# Patient Record
Sex: FEMALE | Race: BLACK OR AFRICAN AMERICAN | NOT HISPANIC OR LATINO
[De-identification: names, ages, dates, MRNs, and addresses within clinical notes are randomized per-mention and may not be internally consistent; named-entity substitution may affect disease eponyms.]

---

## 2017-06-19 ENCOUNTER — RX RENEWAL (OUTPATIENT)
Age: 27
End: 2017-06-19

## 2017-06-19 ENCOUNTER — APPOINTMENT (OUTPATIENT)
Dept: OBGYN | Facility: CLINIC | Age: 27
End: 2017-06-19

## 2017-06-21 ENCOUNTER — RX RENEWAL (OUTPATIENT)
Age: 27
End: 2017-06-21

## 2023-04-06 ENCOUNTER — APPOINTMENT (OUTPATIENT)
Dept: ANTEPARTUM | Facility: CLINIC | Age: 33
End: 2023-04-06

## 2023-04-06 ENCOUNTER — APPOINTMENT (OUTPATIENT)
Dept: ANTEPARTUM | Facility: CLINIC | Age: 33
End: 2023-04-06
Payer: MEDICAID

## 2023-04-06 ENCOUNTER — INPATIENT (INPATIENT)
Facility: HOSPITAL | Age: 33
LOS: 3 days | Discharge: HOME | End: 2023-04-10
Attending: OBSTETRICS & GYNECOLOGY | Admitting: OBSTETRICS & GYNECOLOGY
Payer: MEDICAID

## 2023-04-06 ENCOUNTER — ASOB RESULT (OUTPATIENT)
Age: 33
End: 2023-04-06

## 2023-04-06 ENCOUNTER — EMERGENCY (EMERGENCY)
Facility: HOSPITAL | Age: 33
LOS: 1 days | Discharge: LEFT BEFORE DISCHARGE | End: 2023-04-06
Admitting: EMERGENCY MEDICINE
Payer: MEDICAID

## 2023-04-06 VITALS
RESPIRATION RATE: 18 BRPM | HEART RATE: 76 BPM | TEMPERATURE: 98 F | OXYGEN SATURATION: 100 % | SYSTOLIC BLOOD PRESSURE: 93 MMHG | DIASTOLIC BLOOD PRESSURE: 58 MMHG

## 2023-04-06 VITALS
RESPIRATION RATE: 16 BRPM | TEMPERATURE: 98 F | DIASTOLIC BLOOD PRESSURE: 58 MMHG | HEART RATE: 79 BPM | SYSTOLIC BLOOD PRESSURE: 103 MMHG

## 2023-04-06 DIAGNOSIS — Z98.890 OTHER SPECIFIED POSTPROCEDURAL STATES: Chronic | ICD-10-CM

## 2023-04-06 DIAGNOSIS — O26.899 OTHER SPECIFIED PREGNANCY RELATED CONDITIONS, UNSPECIFIED TRIMESTER: ICD-10-CM

## 2023-04-06 LAB
ALBUMIN SERPL ELPH-MCNC: 3.6 G/DL — SIGNIFICANT CHANGE UP (ref 3.3–5)
ALBUMIN SERPL ELPH-MCNC: 3.8 G/DL — SIGNIFICANT CHANGE UP (ref 3.3–5)
ALP SERPL-CCNC: 60 U/L — SIGNIFICANT CHANGE UP (ref 40–120)
ALP SERPL-CCNC: 61 U/L — SIGNIFICANT CHANGE UP (ref 40–120)
ALT FLD-CCNC: 15 U/L — SIGNIFICANT CHANGE UP (ref 4–33)
ALT FLD-CCNC: 19 U/L — SIGNIFICANT CHANGE UP (ref 4–33)
AMPHET UR-MCNC: NEGATIVE — SIGNIFICANT CHANGE UP
ANION GAP SERPL CALC-SCNC: 10 MMOL/L — SIGNIFICANT CHANGE UP (ref 7–14)
ANION GAP SERPL CALC-SCNC: 9 MMOL/L — SIGNIFICANT CHANGE UP (ref 7–14)
APPEARANCE UR: CLEAR — SIGNIFICANT CHANGE UP
APTT BLD: 27.9 SEC — SIGNIFICANT CHANGE UP (ref 27–36.3)
AST SERPL-CCNC: 21 U/L — SIGNIFICANT CHANGE UP (ref 4–32)
AST SERPL-CCNC: 26 U/L — SIGNIFICANT CHANGE UP (ref 4–32)
BARBITURATES UR SCN-MCNC: NEGATIVE — SIGNIFICANT CHANGE UP
BASOPHILS # BLD AUTO: 0.01 K/UL — SIGNIFICANT CHANGE UP (ref 0–0.2)
BASOPHILS NFR BLD AUTO: 0.1 % — SIGNIFICANT CHANGE UP (ref 0–2)
BENZODIAZ UR-MCNC: NEGATIVE — SIGNIFICANT CHANGE UP
BILIRUB SERPL-MCNC: <0.2 MG/DL — SIGNIFICANT CHANGE UP (ref 0.2–1.2)
BILIRUB SERPL-MCNC: <0.2 MG/DL — SIGNIFICANT CHANGE UP (ref 0.2–1.2)
BILIRUB UR-MCNC: NEGATIVE — SIGNIFICANT CHANGE UP
BUN SERPL-MCNC: 8 MG/DL — SIGNIFICANT CHANGE UP (ref 7–23)
BUN SERPL-MCNC: 9 MG/DL — SIGNIFICANT CHANGE UP (ref 7–23)
CALCIUM SERPL-MCNC: 8.9 MG/DL — SIGNIFICANT CHANGE UP (ref 8.4–10.5)
CALCIUM SERPL-MCNC: 9 MG/DL — SIGNIFICANT CHANGE UP (ref 8.4–10.5)
CHLORIDE SERPL-SCNC: 103 MMOL/L — SIGNIFICANT CHANGE UP (ref 98–107)
CHLORIDE SERPL-SCNC: 105 MMOL/L — SIGNIFICANT CHANGE UP (ref 98–107)
CO2 SERPL-SCNC: 21 MMOL/L — LOW (ref 22–31)
CO2 SERPL-SCNC: 22 MMOL/L — SIGNIFICANT CHANGE UP (ref 22–31)
COCAINE METAB.OTHER UR-MCNC: NEGATIVE — SIGNIFICANT CHANGE UP
COLOR SPEC: SIGNIFICANT CHANGE UP
COVID-19 SPIKE DOMAIN AB INTERP: POSITIVE
COVID-19 SPIKE DOMAIN ANTIBODY RESULT: >250 U/ML — HIGH
CREAT SERPL-MCNC: 0.54 MG/DL — SIGNIFICANT CHANGE UP (ref 0.5–1.3)
CREAT SERPL-MCNC: 0.55 MG/DL — SIGNIFICANT CHANGE UP (ref 0.5–1.3)
CREATININE URINE RESULT, DAU: 73 MG/DL — SIGNIFICANT CHANGE UP
DIFF PNL FLD: NEGATIVE — SIGNIFICANT CHANGE UP
EGFR: 125 ML/MIN/1.73M2 — SIGNIFICANT CHANGE UP
EGFR: 125 ML/MIN/1.73M2 — SIGNIFICANT CHANGE UP
EOSINOPHIL # BLD AUTO: 0.09 K/UL — SIGNIFICANT CHANGE UP (ref 0–0.5)
EOSINOPHIL NFR BLD AUTO: 1.2 % — SIGNIFICANT CHANGE UP (ref 0–6)
FIBRINOGEN PPP-MCNC: 399 MG/DL — SIGNIFICANT CHANGE UP (ref 200–465)
GLUCOSE SERPL-MCNC: 79 MG/DL — SIGNIFICANT CHANGE UP (ref 70–99)
GLUCOSE SERPL-MCNC: 98 MG/DL — SIGNIFICANT CHANGE UP (ref 70–99)
GLUCOSE UR QL: NEGATIVE — SIGNIFICANT CHANGE UP
HCT VFR BLD CALC: 28.6 % — LOW (ref 34.5–45)
HCT VFR BLD CALC: 30.1 % — LOW (ref 34.5–45)
HGB BLD-MCNC: 9.2 G/DL — LOW (ref 11.5–15.5)
HGB BLD-MCNC: 9.7 G/DL — LOW (ref 11.5–15.5)
IANC: 5.16 K/UL — SIGNIFICANT CHANGE UP (ref 1.8–7.4)
IMM GRANULOCYTES NFR BLD AUTO: 0.3 % — SIGNIFICANT CHANGE UP (ref 0–0.9)
KETONES UR-MCNC: NEGATIVE — SIGNIFICANT CHANGE UP
KLEIHAUER-BETKE CALCULATION: 0 % — SIGNIFICANT CHANGE UP
LEUKOCYTE ESTERASE UR-ACNC: NEGATIVE — SIGNIFICANT CHANGE UP
LYMPHOCYTES # BLD AUTO: 1.42 K/UL — SIGNIFICANT CHANGE UP (ref 1–3.3)
LYMPHOCYTES # BLD AUTO: 19.6 % — SIGNIFICANT CHANGE UP (ref 13–44)
MAGNESIUM SERPL-MCNC: 1.7 MG/DL — SIGNIFICANT CHANGE UP (ref 1.6–2.6)
MCHC RBC-ENTMCNC: 29 PG — SIGNIFICANT CHANGE UP (ref 27–34)
MCHC RBC-ENTMCNC: 29.1 PG — SIGNIFICANT CHANGE UP (ref 27–34)
MCHC RBC-ENTMCNC: 32.2 GM/DL — SIGNIFICANT CHANGE UP (ref 32–36)
MCHC RBC-ENTMCNC: 32.2 GM/DL — SIGNIFICANT CHANGE UP (ref 32–36)
MCV RBC AUTO: 90.1 FL — SIGNIFICANT CHANGE UP (ref 80–100)
MCV RBC AUTO: 90.5 FL — SIGNIFICANT CHANGE UP (ref 80–100)
METHADONE UR-MCNC: NEGATIVE — SIGNIFICANT CHANGE UP
MONOCYTES # BLD AUTO: 0.54 K/UL — SIGNIFICANT CHANGE UP (ref 0–0.9)
MONOCYTES NFR BLD AUTO: 7.5 % — SIGNIFICANT CHANGE UP (ref 2–14)
NEUTROPHILS # BLD AUTO: 5.16 K/UL — SIGNIFICANT CHANGE UP (ref 1.8–7.4)
NEUTROPHILS NFR BLD AUTO: 71.3 % — SIGNIFICANT CHANGE UP (ref 43–77)
NITRITE UR-MCNC: NEGATIVE — SIGNIFICANT CHANGE UP
NRBC # BLD: 0 /100 WBCS — SIGNIFICANT CHANGE UP (ref 0–0)
NRBC # BLD: 0 /100 WBCS — SIGNIFICANT CHANGE UP (ref 0–0)
NRBC # FLD: 0 K/UL — SIGNIFICANT CHANGE UP (ref 0–0)
NRBC # FLD: 0 K/UL — SIGNIFICANT CHANGE UP (ref 0–0)
OPIATES UR-MCNC: NEGATIVE — SIGNIFICANT CHANGE UP
OXYCODONE UR-MCNC: NEGATIVE — SIGNIFICANT CHANGE UP
PCP SPEC-MCNC: SIGNIFICANT CHANGE UP
PCP UR-MCNC: NEGATIVE — SIGNIFICANT CHANGE UP
PH UR: 7 — SIGNIFICANT CHANGE UP (ref 5–8)
PHOSPHATE SERPL-MCNC: 3.2 MG/DL — SIGNIFICANT CHANGE UP (ref 2.5–4.5)
PLATELET # BLD AUTO: 204 K/UL — SIGNIFICANT CHANGE UP (ref 150–400)
PLATELET # BLD AUTO: 212 K/UL — SIGNIFICANT CHANGE UP (ref 150–400)
POTASSIUM SERPL-MCNC: 3.5 MMOL/L — SIGNIFICANT CHANGE UP (ref 3.5–5.3)
POTASSIUM SERPL-MCNC: 3.6 MMOL/L — SIGNIFICANT CHANGE UP (ref 3.5–5.3)
POTASSIUM SERPL-SCNC: 3.5 MMOL/L — SIGNIFICANT CHANGE UP (ref 3.5–5.3)
POTASSIUM SERPL-SCNC: 3.6 MMOL/L — SIGNIFICANT CHANGE UP (ref 3.5–5.3)
PROT SERPL-MCNC: 5.7 G/DL — LOW (ref 6–8.3)
PROT SERPL-MCNC: 6.4 G/DL — SIGNIFICANT CHANGE UP (ref 6–8.3)
PROT UR-MCNC: NEGATIVE — SIGNIFICANT CHANGE UP
RBC # BLD: 3.16 M/UL — LOW (ref 3.8–5.2)
RBC # BLD: 3.34 M/UL — LOW (ref 3.8–5.2)
RBC # FLD: 12.5 % — SIGNIFICANT CHANGE UP (ref 10.3–14.5)
RBC # FLD: 12.5 % — SIGNIFICANT CHANGE UP (ref 10.3–14.5)
RH IG SCN BLD-IMP: POSITIVE — SIGNIFICANT CHANGE UP
RH IG SCN BLD-IMP: POSITIVE — SIGNIFICANT CHANGE UP
SARS-COV-2 IGG+IGM SERPL QL IA: >250 U/ML — HIGH
SARS-COV-2 IGG+IGM SERPL QL IA: POSITIVE
SARS-COV-2 RNA SPEC QL NAA+PROBE: SIGNIFICANT CHANGE UP
SODIUM SERPL-SCNC: 134 MMOL/L — LOW (ref 135–145)
SODIUM SERPL-SCNC: 136 MMOL/L — SIGNIFICANT CHANGE UP (ref 135–145)
SP GR SPEC: 1.01 — SIGNIFICANT CHANGE UP (ref 1.01–1.05)
T PALLIDUM AB TITR SER: NEGATIVE — SIGNIFICANT CHANGE UP
THC UR QL: POSITIVE
URATE SERPL-MCNC: 2.9 MG/DL — SIGNIFICANT CHANGE UP (ref 2.5–7)
UROBILINOGEN FLD QL: SIGNIFICANT CHANGE UP
WBC # BLD: 7.24 K/UL — SIGNIFICANT CHANGE UP (ref 3.8–10.5)
WBC # BLD: 8.12 K/UL — SIGNIFICANT CHANGE UP (ref 3.8–10.5)
WBC # FLD AUTO: 7.24 K/UL — SIGNIFICANT CHANGE UP (ref 3.8–10.5)
WBC # FLD AUTO: 8.12 K/UL — SIGNIFICANT CHANGE UP (ref 3.8–10.5)

## 2023-04-06 PROCEDURE — 76827 ECHO EXAM OF FETAL HEART: CPT | Mod: 26

## 2023-04-06 PROCEDURE — 99254 IP/OBS CNSLTJ NEW/EST MOD 60: CPT | Mod: GC

## 2023-04-06 PROCEDURE — 93306 TTE W/DOPPLER COMPLETE: CPT | Mod: 26

## 2023-04-06 PROCEDURE — 99254 IP/OBS CNSLTJ NEW/EST MOD 60: CPT | Mod: 25

## 2023-04-06 PROCEDURE — 76825 ECHO EXAM OF FETAL HEART: CPT | Mod: 26

## 2023-04-06 PROCEDURE — 76820 UMBILICAL ARTERY ECHO: CPT | Mod: 26

## 2023-04-06 PROCEDURE — 93010 ELECTROCARDIOGRAM REPORT: CPT

## 2023-04-06 PROCEDURE — L9996: CPT

## 2023-04-06 PROCEDURE — 99232 SBSQ HOSP IP/OBS MODERATE 35: CPT

## 2023-04-06 PROCEDURE — 76819 FETAL BIOPHYS PROFIL W/O NST: CPT | Mod: 26

## 2023-04-06 PROCEDURE — 93325 DOPPLER ECHO COLOR FLOW MAPG: CPT | Mod: 26

## 2023-04-06 RX ORDER — SODIUM CHLORIDE 9 MG/ML
1000 INJECTION, SOLUTION INTRAVENOUS
Refills: 0 | Status: DISCONTINUED | OUTPATIENT
Start: 2023-04-06 | End: 2023-04-07

## 2023-04-06 RX ORDER — HEPARIN SODIUM 5000 [USP'U]/ML
5000 INJECTION INTRAVENOUS; SUBCUTANEOUS EVERY 12 HOURS
Refills: 0 | Status: DISCONTINUED | OUTPATIENT
Start: 2023-04-06 | End: 2023-04-10

## 2023-04-06 RX ADMIN — SODIUM CHLORIDE 125 MILLILITER(S): 9 INJECTION, SOLUTION INTRAVENOUS at 13:00

## 2023-04-06 NOTE — OB PROVIDER TRIAGE NOTE - NSHPPHYSICALEXAM_GEN_ALL_CORE
pt seen upon arrival     in [place]        pt in NAD    lungs clear   heart s1 s2   abd soft gravid non tender    placed on EFM  FHR  140      abd scan

## 2023-04-06 NOTE — CONSULT NOTE PEDS - ASSESSMENT
Ms. Walters is a 32 yo woman, , currently at 28 1/7 weeks gestation.  Her fetus has been found to be in supraventricular tachycardia (possibly AVNRT).  There are no signs of hydrops at this time.      Recommendation:  1. Admit mother to OB unit for fetal heart rate monitoring and maternal telemetry monitoring  2. Monitor fetal heart rates over the next 12 hours (overnight) to determine the frequency of the arrhythmia  3. BID non-stress tests  4. Obtain a maternal EKG, along with a basic metabolic panel  5. Please obtain an adult cardiology consultation for clearance to start an antiarrhythmic medication (we will likely treat with either digoxin or flecainide)  6. Decision on treatment will be made after monitoring for ~ 12 hours.

## 2023-04-06 NOTE — OB PROVIDER TRIAGE NOTE - HISTORY OF PRESENT ILLNESS
33 year old female   at 28.1 weeks gestation who was sent from office for Fetal tachycardia (  on scan via M mode)  and pt denied any prior know episodes of fetal tachycardia     denied any recent trauma  travel  or illnesses   denied any new medications or herbal products   denied any use of audra butter skin products     denied any contractions  LOF VB   feels Good FM.     PNC  [place]    Scan today    BPP 8/8 EFW 1039g (2.5#) 11%  posterior placenta  AAFI    stated Placenta previa resolved    willing to accept blood

## 2023-04-06 NOTE — CHART NOTE - NSCHARTNOTEFT_GEN_A_CORE
R3 Chart note    32yo  at 28w1d patient seen at bedside for evaluation after being sent in from OBGYN office for fetal heart rate measuring 260s in the office. Patient denies all complaints at this time. Denies ctx, LOF, VB and dec FM. Patient reports that this has been an uncomplicated pregnancy and denies any PMSH. NST reactive in triage with normal baseline until periods unable to trace. At which point, triage providers used sono to calculate FHR in the 260s. ATU official US being performed at this time and pediatric cardiology consultation placed for fetal echo and concern for fetal SVT. Recommendations pending full evaluation, plan to admit patient for further monitoring at this time.     seen and d/w Dr. Shelby Moore PGY3

## 2023-04-06 NOTE — CONSULT NOTE ADULT - NS ATTEND AMEND GEN_ALL_CORE FT
33 year old woman 28 weeks with a pmhx of JUANCARLOS who baby is found to have fetal SVT ( on scan via M mode). Adult EP consulted to assist in managing the patient during cardiac medication administration.     #Fetal SVT  - No contraindications to either Dig of Flecainide  - Can dig load per peds cardiology protocol  - On dig, please monitor for heart block, pAT, or AF. Would stop dig for any of the above  - Keep K at 4.0-4.5, Mg 2.0-2.2  - If Dig fails, no contraindication to starting Flecainide per peds cardiology protocol. Please start the patient on Metoprolol if Flecainide is initiated. 33 year old woman 28 weeks with a pmhx of JUANCARLOS who baby is found to have fetal SVT ( on scan via M mode). Adult EP consulted to assist in managing the patient during cardiac medication administration.     #Fetal SVT  - No contraindications to either Dig or Flecainide  - Can dig load per peds cardiology protocol  - On dig, please monitor for heart block, pAT, or AF. Would stop dig for any of the above  - Keep K at 4.0-4.5, Mg 2.0-2.2  - If Dig fails, no contraindication to starting Flecainide per peds cardiology protocol. Please start the patient on Metoprolol if Flecainide is initiated.

## 2023-04-06 NOTE — PROVIDER CONTACT NOTE (OTHER) - BACKGROUND
Pt admitted for antepartum observation after being sent from office for FHR 260s. pt r/o fetal SVT. Fetal echo performed, peds cardiology and adult cardiology consults performed.

## 2023-04-06 NOTE — CONSULT NOTE ADULT - ASSESSMENT
32yo  at 28w1d patient admitted with concern for fetal tachycardia.     #Concern for fetal SVT  - Appreciate peds cards recs  - Prelim recs: Maternal cardiology consult, EKG, ECHO  - f/u fetal echo     #Fetal wellbeing   - NST BID  - NICU consult prn     #Maternal wellbeing   - PNV  - Regular diet  - SCDs and ambulation for DTV prophylaxis     seen and d/w Dr. Shelby Moore PGY3

## 2023-04-06 NOTE — CONSULT NOTE ADULT - ASSESSMENT
This is a 33 year old woman 28 weeks  with a pmhx of JUANCARLOS who presented with fetal SVT ( on scan via M mode). Patient admitted to nausea during her pregnancy which she used marijuana to help control her nausea. Patient does not have cardiologist and stated that she never had a echocardiogram.  EP was consulted for maternal monitor as the fetal is in SVT. EKG showed SR HR 78BPM and .       Plan:  - Continuous telemetric monitoring  - Monitor electrolytes and replete K to 4 and Mg to 2  - Monitor renal function   -TTE to assess for structural heart disease  - Digoxin load. Stop digoxin if patient goes into heart block or paroxysmal AT/Afib  - Continue care per primary team    Shaye Cronin PA-C  Patient to be staffed with attending. Please await attending addendum This is a 33 year old woman 28 weeks  with a pmhx of JUANCARLOS who presented with fetal SVT ( on scan via M mode). Patient admitted to nausea during her pregnancy which she used marijuana to help control her nausea. Patient does not have cardiologist and stated that she never had a echocardiogram.  EP was consulted for maternal monitor as the fetal is in SVT. EKG showed SR HR 78BPM and .       Plan:  - Continuous telemetric monitoring  - Monitor electrolytes and replete K to 4 and Mg to 2  - Monitor renal function   -TTE to assess for structural heart disease  - Can Digoxin load per [facility] cardiology protocol. Stop digoxin if patient goes into heart block or paroxysmal AT/Afib  - If Dig does not control rhythm, can proceed with Flecainide per protocol. Metoprolol will need to be initiated if Flecainide is started.  - Continue care per primary team    Shaye Cronin PA-C  Patient to be staffed with attending. Please await attending addendum

## 2023-04-06 NOTE — OB PROVIDER TRIAGE NOTE - NSOBPROVIDERNOTE_OBGYN_ALL_OB_FT
33 year old female P0 at 28.1 weeks  fetal tachycardia  r/o fetal SVT       11am  unable to trace FHR originially tracing was reactive     scan done for FHR check   M mode     BPP 8/8   vertex Posterior placenta  questionable  previa    Dr Anderson and DR Sousa  notified   1115 Scan by ATU sonographer confirmed fetal Tachycardia  260  posterior placenta no previa    counselled  by Dr Morse    admission for observations  likely intermittent Fetal SVT     Peds cardio consult   and for fetal echo by peds cardio   plan of care discussed with patient       Miles WAYNE

## 2023-04-06 NOTE — CONSULT NOTE ADULT - ATTENDING COMMENTS
Fetal SVT noted on today' scan.  Peds cardiology consult and agree with diagnosis.  Plan for Adult cardiology consultation for maternal clearance and likely antiarrythmic agent initiation for fetal rate control.

## 2023-04-06 NOTE — PROVIDER CONTACT NOTE (OTHER) - ASSESSMENT
pt placed on continuous FHR monitoring as per HARISH Ryan. FHR monitor tracing value at 130 bpm, however, at bedside FHR is audible at about 250-260 bpm.

## 2023-04-06 NOTE — CONSULT NOTE ADULT - SUBJECTIVE AND OBJECTIVE BOX
Source: patient and Chart    HPI:  This is a 33 year old woman 28 weeks  with a pmhx of JUANCARLOS who presented with fetal SVT ( on scan via M mode). Patient admitted to nausea during her pregnancy which she used marijuana to help control her nausea.  Patient denied fever, chills, diaphoresis, HA, lightheadedness, dizziness, CP, palpitation, orthopnea, SOB, abdominal pain, LE edmea hematuria, melena, hematochezia, numbness and tingling. Patient does not have cardiologist and stated that she never had a echocardiogram.     OBGYN triage was significant for T=98.2F, HR 79BPM, /58mmHg and RR 16. Labs were significant for WBC 7.24, Hgb 9.7, HCT 30.1, , Na 134, K 3.6, sCr 0.55, BUN 9AST 26, ALT 19, Alk phos 61, UA negative and positive UA THC. MFM was consulted for fetal SVT.  NST reactive in triage with normal baseline until periods unable to trace. Sono triage providers calculate FHR in the 260s. EP was consulted for maternal monitor as the fetal is in SVT. EKG showed SR HR 78BPM and .     PAST MEDICAL & SURGICAL HISTORY:  Iron deficiency anemia, unspecified iron deficiency anemia type  History of dilatation and curettage  History of lumpectomy    MEDICATIONS  (STANDING):  heparin   Injectable 5000 Unit(s) SubCutaneous every 12 hours  lactated ringers. 1000 milliLiter(s) (125 mL/Hr) IV Continuous <Continuous>  prenatal multivitamin 1 Tablet(s) Oral daily    MEDICATIONS  (PRN):      FAMILY HISTORY:  per patient parents were healthy    SOCIAL HISTORY:  CIGARETTES: Denied  ALCOHOL: socially prior to her pregnancy   ILLICIT DRUG USES: marijuana last used last week    REVIEW OF SYSTEMS:  CONSTITUTIONAL: No fever, weight loss, chills, shakes, or fatigue  RESPIRATORY: No cough, wheezing, hemoptysis, or shortness of breath  CARDIOVASCULAR: per HPI  GASTROINTESTINAL: No abdominal  or epigastric pain, nausea, vomiting, hematemesis, diarrhea, constipation, melena or bright red blood.  GENITOURINARY: No dysuria, nocturia, hematuria, or urinary incontinence  NEUROLOGICAL: No headaches, memory loss, slurred speech, limb weakness, loss of strength, numbness, or tremors  MUSCULOSKELETAL: No joint pain or swelling, muscle, back, or extremity pain      Vital Signs Last 24 Hrs  T(C): 37.0 (2023 15:44), Max: 37.0 (2023 15:44)  T(F): 98.6 (2023 15:44), Max: 98.6 (2023 15:44)  HR: 72 (2023 15:45) (68 - 82)  BP: 106/59 (2023 15:45) (93/58 - 117/64)  BP(mean): --  RR: 14 (2023 15:44) (14 - 18)  SpO2: 100% (2023 09:39) (100% - 100%)        PHYSICAL EXAM:  GENERAL: Well appearing, speaking in full sentence, in NAD  HEART: S1S2 RRR  PULMONARY:CTABL, normal respiratory effort  ABDOMEN: Bowel sounds present, soft   EXTREMITIES:  Warm, well -perfused, no pedal edema, distal pulses present  NEUROLOGICAL:AOx3     I&O's Detail    2023 07:01  -  2023 16:48  --------------------------------------------------------  IN:    Lactated Ringers: 375 mL  Total IN: 375 mL    OUT:  Total OUT: 0 mL    Total NET: 375 mL          LABS:                        9.7    7.24  )-----------( 212      ( 2023 11:35 )             30.1     04-06    134<L>  |  103  |  9   ----------------------------<  79  3.6   |  22  |  0.55    Ca    9.0      2023 11:35    TPro  6.4  /  Alb  3.8  /  TBili  <0.2  /  DBili  x   /  AST  26  /  ALT  19  /  AlkPhos  61  04-06        PTT - ( 2023 11:35 )  PTT:27.9 sec  Urinalysis Basic - ( 2023 11:35 )    Color: Light Yellow / Appearance: Clear / S.013 / pH: x  Gluc: x / Ketone: Negative  / Bili: Negative / Urobili: <2 mg/dL   Blood: x / Protein: Negative / Nitrite: Negative   Leuk Esterase: Negative / RBC: x / WBC x   Sq Epi: x / Non Sq Epi: x / Bacteria: x      BNP  I&O's Detail    2023 07:01  -  2023 16:48  --------------------------------------------------------  IN:    Lactated Ringers: 375 mL  Total IN: 375 mL    OUT:  Total OUT: 0 mL    Total NET: 375 mL    Daily Height in cm: 162.56 (2023 12:12)    Daily Weight Pre-pregnancy in k (2023 12:12)    
MFM Consult note     32yo  at 28w1d patient seen at bedside for evaluation after being sent in from OBGYN office for fetal heart rate measuring 260s in the office. Patient denies all complaints at this time. Denies ctx, LOF, VB and dec FM. Patient reports that this has been an uncomplicated pregnancy and denies any PMSH. NST reactive in triage with normal baseline until periods unable to trace. At which point, triage providers used sono to calculate FHR in the 260s.     Vital Signs Last 24 Hrs  T(C): 37.0 (2023 15:44), Max: 37.0 (2023 15:44)  T(F): 98.6 (2023 15:44), Max: 98.6 (2023 15:44)  HR: 72 (2023 15:45) (68 - 82)  BP: 106/59 (2023 15:45) (93/58 - 117/64)  BP(mean): --  RR: 14 (2023 15:44) (14 - 18)  SpO2: 100% (2023 09:39) (100% - 100%)    Gen NAD  CV Regular   Pulm comfortable on RA  Abd soft nontender   Ext nontender                           9.7    7.24  )-----------( 212      ( 2023 11:35 )             30.1

## 2023-04-06 NOTE — OB RN PATIENT PROFILE - BIRTH SEX
Xeljanz Counseling: I discussed with the patient the risks of Xeljanz therapy including increased risk of infection, liver issues, headache, diarrhea, or cold symptoms. Live vaccines should be avoided. They were instructed to call if they have any problems. Female

## 2023-04-06 NOTE — OB PROVIDER H&P - NSHPPHYSICALEXAM_GEN_ALL_CORE
pt seen upon arrival     in [place]        pt in NAD    lungs clear   heart s1 s2   abd soft gravid non tender    placed on EFM  FHR  140      abd scan  via M mode    vertex BPP 8/8  [place]    Posterior placenta   confirmed no previa by ATU

## 2023-04-06 NOTE — OB PROVIDER H&P - ASSESSMENT
33 year old female 28 weeks with  Fetal Tachycardia (  )  suspected fetal SVT     Peds Cardio consult    fetal echo by peds Cardio  observation and monitoring   admission  orders   admission labs    covid 19 testing     seen and counselled with DR Anderson and Dr Shelby WAYNE

## 2023-04-06 NOTE — CONSULT NOTE PEDS - SUBJECTIVE AND OBJECTIVE BOX
CHIEF COMPLAINT: Fetal tachycardia    HISTORY OF PRESENT ILLNESS: KATHY WOODS is a 33y old  at 28w1d GA with incidental finding of fetal SVT. Patient sent in from OBGYN office for fetal heart rate measuring 260s in the office. Patient denies all complaints at this time. Patient reports that this has been an uncomplicated pregnancy and denies any past medical /cardiac history. She is not on any medication. No family history of any cardiac disease, sudden cardiac death, arrythmia or need for pacemaker.   Pediatric cardiology consulted due to concerns for fetal SVT.    REVIEW OF SYSTEMS:  Constitutional - no fever, no poor weight gain.  Eyes - no conjunctivitis, no discharge.  Ears / Nose / Mouth / Throat - no congestion, no stridor.  Respiratory - no tachypnea, no increased work of breathing.  Cardiovascular - no cyanosis, no syncope.  Gastrointestinal - no vomiting, no diarrhea.  Integumentary - no rash, no pallor.  Musculoskeletal - no joint swelling, no joint stiffness.  Endocrine - no jitteriness, no failure to thrive.  Neurological - no seizures, no change in activity level.    PAST MEDICAL/SURGICAL HISTORY:  Medical Problems - see HPI for details.  Surgical History - see HPI for details.  Allergies - No Known Allergies    MEDICATIONS:  lactated ringers. 1000 milliLiter(s) IV Continuous <Continuous>  prenatal multivitamin 1 Tablet(s) Oral daily  heparin   Injectable 5000 Unit(s) SubCutaneous every 12 hours    FAMILY HISTORY:  There is no pertinent cardiac family history.    SOCIAL HISTORY:  The patient lives with family.    PHYSICAL EXAMINATION:  Vital signs - Weight (kg): 62.1 ( @ 12:12)  T(C): 37.0 (23 @ 15:44), Max: 37.0 (23 @ 15:44)  HR: 72 (23 @ 15:45) (68 - 82)  BP: 106/59 (23 @ 15:45) (93/58 - 117/64)  RR: 14 (23 @ 15:44) (14 - 18)  SpO2: 100% (23 @ 09:39) (100% - 100%)    General - non-dysmorphic, well-developed.  Skin - no rash, no cyanosis.  Eyes / ENT - external appearance of eyes, ears, & nares normal.  Pulmonary - normal inspiratory effort, no retractions, lungs clear bilaterally, no wheezes, no rales.  Cardiovascular - normal rate, regular rhythm, normal S1 & S2, no murmurs, no rubs, no gallops, capillary refill < 2sec, normal pulses.  Gastrointestinal - soft, no hepatomegaly.  Musculoskeletal - no clubbing, no edema.  Neurologic / Psychiatric - moves all extremities, normal tone.    LABORATORY TESTS                          9.7  CBC:   7.24 )-----------( 212   (23 @ 11:35)                          30.1               134   |  103   |  9                  Ca: 9.0    BMP:   ----------------------------< 79     Mg: x     (23 @ 11:35)             3.6    |  22    | 0.55               Ph: x        LFT:     TPro: 6.4 / Alb: 3.8 / TBili: <0.2 / DBili: x / AST: 26 / ALT: 19 / AlkPhos: 61   (23 @ 11:35)    COAG: PT: x / PTT: 27.9 / INR: x   (23 @ 11:35)     IMAGING STUDIES:  Fetal Echocardiogram - (23)     Summary:   1. The fetal heart rhythm is supraventricular tachycardiawith fetal heart rate at ~ 256 bpm. 1:1 conduction.   2. Normal left ventricular size. LV systolic function is fair (mildly decreased in the setting of SVT, good wall motion).   3. Normal right ventricular size. RV systolic function is mildly decreased (in the setting of SVT).   4. Trivial tricuspid valve regurgitation.   5. No signs of hydrops, with no signs of a pericardial or pleural effusion and no signs of ascites.

## 2023-04-07 ENCOUNTER — ASOB RESULT (OUTPATIENT)
Age: 33
End: 2023-04-07

## 2023-04-07 ENCOUNTER — APPOINTMENT (OUTPATIENT)
Dept: ANTEPARTUM | Facility: CLINIC | Age: 33
End: 2023-04-07

## 2023-04-07 ENCOUNTER — TRANSCRIPTION ENCOUNTER (OUTPATIENT)
Age: 33
End: 2023-04-07

## 2023-04-07 DIAGNOSIS — R52 PAIN, UNSPECIFIED: ICD-10-CM

## 2023-04-07 PROBLEM — D50.9 IRON DEFICIENCY ANEMIA, UNSPECIFIED: Chronic | Status: ACTIVE | Noted: 2023-04-06

## 2023-04-07 PROCEDURE — 99232 SBSQ HOSP IP/OBS MODERATE 35: CPT | Mod: GC

## 2023-04-07 PROCEDURE — 93010 ELECTROCARDIOGRAM REPORT: CPT

## 2023-04-07 PROCEDURE — 99232 SBSQ HOSP IP/OBS MODERATE 35: CPT

## 2023-04-07 RX ORDER — POTASSIUM CHLORIDE 20 MEQ
40 PACKET (EA) ORAL EVERY 4 HOURS
Refills: 0 | Status: COMPLETED | OUTPATIENT
Start: 2023-04-07 | End: 2023-04-07

## 2023-04-07 RX ORDER — DIGOXIN 250 MCG
500 TABLET ORAL EVERY 8 HOURS
Refills: 0 | Status: COMPLETED | OUTPATIENT
Start: 2023-04-07 | End: 2023-04-08

## 2023-04-07 RX ORDER — MAGNESIUM SULFATE 500 MG/ML
1 VIAL (ML) INJECTION ONCE
Refills: 0 | Status: COMPLETED | OUTPATIENT
Start: 2023-04-07 | End: 2023-04-07

## 2023-04-07 RX ADMIN — Medication 500 MICROGRAM(S): at 21:21

## 2023-04-07 RX ADMIN — Medication 100 GRAM(S): at 09:33

## 2023-04-07 RX ADMIN — Medication 1 TABLET(S): at 12:34

## 2023-04-07 RX ADMIN — Medication 500 MICROGRAM(S): at 12:26

## 2023-04-07 RX ADMIN — Medication 40 MILLIEQUIVALENT(S): at 13:30

## 2023-04-07 RX ADMIN — Medication 40 MILLIEQUIVALENT(S): at 09:30

## 2023-04-07 NOTE — DISCHARGE NOTE ANTEPARTUM - CARE PLAN
Principal Discharge DX:	Cardiac arrhythmia  Assessment and plan of treatment:	Full recovery   1 Principal Discharge DX:	Cardiac arrhythmia  Assessment and plan of treatment:	Take Flecainide as prescribed. Call the OBGYN office to schedule a follow-up in the clinic this week. Follow up with pediatric cardiology next week. Follow-up with cardiology (Dr. Hood) on 4/13 at 10am and with Dr. Richards on 4/17 at 12pm. Follow-up with Dr. Springer (electrophysiology) on 4/26 at 11am.

## 2023-04-07 NOTE — DISCHARGE NOTE ANTEPARTUM - CARE PROVIDER_API CALL
Jessa Gonzales)  Obstetrics and Gynecology  [address], [address]  [place] [address]  Phone: [phone]  Fax: [phone]  Follow Up Time: 1 week   Jessa Gonzales)  Obstetrics and Gynecology  [address], [address]  [place] [address]  Phone: [phone]  Fax: [phone]  Follow Up Time: 1 week    Ly Mcpherson)  Obstetrics and Gynecology  [address] [place] [address]  Phone: [phone]  Fax: [phone]  Follow Up Time:     Umang Richards)  Pediatric Cardiology; Pediatrics  [address] [place] [id number]  Phone: [phone]  Fax: [phone]  Follow Up Time:

## 2023-04-07 NOTE — DISCHARGE NOTE ANTEPARTUM - NS MD DC FALL RISK RISK
For information on Fall & Injury Prevention, visit: [email or web address] OR  [email or web address] OR  https://www.cdc.gov/steadi/patient.html

## 2023-04-07 NOTE — PROGRESS NOTE ADULT - ASSESSMENT
This is a 33 year old woman 28 weeks  with a pmhx of JUANCARLOS who presented with fetal SVT ( on scan via M mode). Patient admitted to nausea during her pregnancy which she used marijuana to help control her nausea. Patient does not have cardiologist and stated that she never had a echocardiogram.  EP was consulted for maternal monitor as the fetal is in SVT. EKG showed SR HR 78BPM and .       Plan:  - Continuous telemetric monitoring  - Monitor electrolytes and replete K to 4 and Mg to 2  - Monitor renal function   - TTE showed EF 65% normal LV function, normal RV size and function, and mild MR.   - Can Digoxin load per Peds cardiology protocol. Stop digoxin if patient goes into heart block or paroxysmal AT/Afib  - If Digoxin does not control rhythm, can proceed with Flecainide per protocol. Metoprolol will need to be initiated if Flecainide is started.  -  monitor and care per MFM, OBGYN, and peds cardiology    Shaye Cronin PA-C  Patient to be staffed with attending. Please await attending addendum This is a 33 year old woman 28 weeks  with a pmhx of JUANCARLOS who presented with fetal SVT ( on scan via M mode). Patient admitted to nausea during her pregnancy which she used marijuana to help control her nausea. Patient does not have cardiologist and stated that she never had a echocardiogram.  EP was consulted for maternal monitor as the fetal is in SVT. EKG showed SR HR 78BPM and .       Plan:  - Continuous telemetric monitoring  - Monitor electrolytes and replete K to 4 and Mg to 2  - Monitor renal function   - TTE showed EF 65% normal LV function, normal RV size and function, and mild MR.   - Can Digoxin load per Peds cardiology protocol. Stop digoxin if patient goes into heart block or paroxysmal AT/Afib  - If Digoxin does not control rhythm, can proceed with Flecainide per protocol. Metoprolol will need to be initiated if Flecainide is started.  -  surveillance and care per MFM, OBGYN, and peds cardiology    Shaye Cronin PA-C  Patient to be staffed with attending. Please await attending addendum

## 2023-04-07 NOTE — DISCHARGE NOTE ANTEPARTUM - PATIENT PORTAL LINK FT
You can access the FollowMyHealth Patient Portal offered by [facility] by registering at the following website: [email or web address]. By joining [facility]’s FollowMyHealth portal, you will also be able to view your health information using other applications (apps) compatible with our system.

## 2023-04-07 NOTE — DISCHARGE NOTE ANTEPARTUM - PROVIDER TOKENS
PROVIDER:[TOKEN:[[id number]],FOLLOWUP:[1 week]] PROVIDER:[TOKEN:[[id number]],FOLLOWUP:[1 week]],PROVIDER:[TOKEN:[[id number]]],PROVIDER:[TOKEN:[[id number]]]

## 2023-04-07 NOTE — PROGRESS NOTE ADULT - SUBJECTIVE AND OBJECTIVE BOX
R3 Antepartum Note, HD#2    Interval events:    Patient seen and examined at bedside. No acute complaints. Fetus remained in SVT overnight. Pt reports +FM, denies LOF, VB, ctx, HA, epigastric pain, blurred vision, CP, SOB, N/V, fevers, and chills.    Vital Signs Last 24 Hours  T(C): 36.6 (04-07-23 @ 03:30), Max: 37.0 (04-06-23 @ 15:44)  HR: 75 (04-07-23 @ 03:29) (68 - 82)  BP: 100/57 (04-07-23 @ 03:29) (93/58 - 117/64)  RR: 16 (04-07-23 @ 03:30) (14 - 18)  SpO2: 100% (04-06-23 @ 09:39) (100% - 100%)    Physical Exam:  General: NAD  Abdomen: Soft, non-tender, gravid  Ext: No pain or swelling    Labs:             9.2    8.12  )-----------( 204      ( 04-06 @ 17:45 )             28.6     04-06 @ 17:45    136  |  105  |  8   ----------------------------<  98  3.5   |  21  |  0.54    Ca    8.9      04-06 @ 17:45  Phos  3.2     04-06 @ 17:45  Mg     1.70     04-06 @ 17:45    TPro  5.7  /  Alb  3.6  /  TBili  <0.2  /  DBili  x   /  AST  21  /  ALT  15  /  AlkPhos  60  04-06 @ 17:45    PTT - ( 04-06 @ 11:35 )  PTT:27.9 sec    Uric Acid: (04-06 @ 11:35)  2.9      Fibrinogen: (04-06 @ 11:35)  --       LDH: (04-06 @ 11:35)  --       MEDICATIONS  (STANDING):  heparin   Injectable 5000 Unit(s) SubCutaneous every 12 hours  prenatal multivitamin 1 Tablet(s) Oral daily

## 2023-04-07 NOTE — PROGRESS NOTE PEDS - SUBJECTIVE AND OBJECTIVE BOX
INTERVAL HISTORY: Fetus remained in SVT overnight.     BACKGROUND INFORMATION: KATHY WOODS is a 33y old  at 28w1d GA with incidental finding of fetal SVT. Patient sent in from OBGYN office for fetal heart rate measuring 260s in the office. Patient denies all complaints at this time. Patient reports that this has been an uncomplicated pregnancy and denies any past medical /cardiac history. She is not on any medication. No family history of any cardiac disease, sudden cardiac death, arrythmia or need for pacemaker.   Pediatric cardiology consulted due to concerns for fetal SVT.  PRIMARY CARDIOLOGIST:   CARDIAC DIAGNOSIS:   OTHER MEDICAL PROBLEMS:   ADMISSION DATE:   SURGICAL DATE:   DISCHARGE DATE: pending    CURRENT INFORMATION  INTAKE/OUTPUT:   @ 07:01  -   @ 07:00  --------------------------------------------------------  IN: 1000 mL / OUT: 0 mL / NET: 1000 mL    MEDICATIONS:  digoxin  Injectable 500 MICROGram(s) IV Push every 8 hours  heparin   Injectable 5000 Unit(s) SubCutaneous every 12 hours  prenatal multivitamin 1 Tablet(s) Oral daily    PHYSICAL EXAMINATION:  Vital signs - Weight (kg): 62.1 ( @ 12:12)  T(C): 36.9 (23 @ 13:32), Max: 37.0 (23 @ 15:44)  HR: 68 (23 @ 12:46) (68 - 76)  BP: 107/65 (23 @ 12:46) (97/52 - 125/58)  RR: 16 (23 @ 13:32) (14 - 16)    General - non-dysmorphic, well-developed.  Skin - no rash, no cyanosis.  Eyes / ENT - external appearance of eyes, ears, & nares normal.  Pulmonary - normal inspiratory effort, no retractions, lungs clear bilaterally, no wheezes, no rales.  Cardiovascular - normal rate, regular rhythm, normal S1 & S2, no murmurs, no rubs, no gallops, capillary refill < 2sec, normal pulses.  Gastrointestinal - soft, no hepatomegaly.  Musculoskeletal - no clubbing, no edema.  Neurologic / Psychiatric - moves all extremities, normal tone.    LABORATORY TESTS                          9.2  CBC:   8.12 )-----------( 204   (23 @ 17:45)                          28.6               136   |  105   |  8                  Ca: 8.9    BMP:   ----------------------------< 98     M.70  (23 @ 17:45)             3.5    |  21    | 0.54               Ph: 3.2      LFT:     TPro: 5.7 / Alb: 3.6 / TBili: <0.2 / DBili: x / AST: 21 / ALT: 15 / AlkPhos: 60   (23 @ 17:45)    COAG: PT: x / PTT: 27.9 / INR: x   (23 @ 11:35)     IMAGING STUDIES:  Electrocardiogram - (*date)      Telemetry - (*dates) normal sinus rhythm, no ectopy, no arrhythmias.    Chest x-ray - (*date) * cardiac silhouette, * pulmonary vascular markings.    Echocardiogram - (*date)

## 2023-04-07 NOTE — PROGRESS NOTE PEDS - ASSESSMENT
Ms. Walters is a 32 yo woman, , currently at 28 2/7 weeks gestation.  Her fetus has been found to be in supraventricular tachycardia (possibly AVNRT).  There are no signs of hydrops at this time.  Fetus remained in SVT overnight. We discussed with patient today the need to initiate treatment for fetal SVT.    Recommendation:  Will initiate treatment per [facility] Fetal Tachycardia guidelines given to services.  We will start with digitalization as follows:  Digitalization: If no hydrops initially   · Baseline maternal potassium   · Baseline maternal EKG and telemetry   · Baseline biophysical profile (BPP)   · If no significant problems with EKG and K normal, Digitalization with 0.5mg iv q 8 h x 3.   · Digoxin level 4-6 hrs post third dose.  Added boluses to achieve maternal level   · Daily 12-lead ECG’s   · Near continuous Fetal heart monitoring   · Daily BPP’s   · Repeat fetal echo if tachycardia terminates or if negative finding on BPP     ·  Digoxin abandoned if  a) tachycardia persists despite level      b) hydrops develops    c) significant maternal ECG changes ( GA > 0.2, QRS >  150, QTc > 470, AVB, ST Changes), or proarrhythmia, or significant side effects develop     · If arrhythmia controlled after Digoxin load, start Digoxin maintenance at 0.25 mg PO BiD.  Ms. Walters is a 34 yo woman, , currently at 28 2/7 weeks gestation.  Her fetus has been found to be in supraventricular tachycardia (possibly AVNRT).  There are no signs of hydrops at this time.  Fetus remained in SVT overnight. We discussed with patient today the need to initiate treatment for fetal SVT.    Recommendation:  Will initiate treatment per [facility] Fetal Tachy[facility] guidelines given to services.  We will start with digitalization as follows:  Digitalization: If no hydrops initially   · Baseline maternal potassium   · Baseline maternal EKG and telemetry   · Baseline biophysical profile (BPP)   · If no significant problems with EKG and K normal, Digitalization with 0.5mg iv q 8 h x 3.   · Digoxin level 4-6 hrs post third dose.  Added boluses to achieve maternal level   · Daily 12-lead ECG’s   · Near continuous Fetal heart monitoring   · Daily BPP’s   · Repeat fetal echo if tachycardia terminates or if negative finding on BPP     -Please contact pediatric cardiology if any questions or concerns  ·  Digoxin abandoned if  a) tachycardia persists despite level      b) hydrops develops    c) significant maternal ECG changes ( SC > 0.2, QRS >  150, QTc > 470, AVB, ST Changes), or proarrhythmia, or significant side effects develop     · If arrhythmia controlled after Digoxin load, start Digoxin maintenance at 0.25 mg PO BiD.

## 2023-04-07 NOTE — DISCHARGE NOTE ANTEPARTUM - MEDICATION SUMMARY - MEDICATIONS TO TAKE
I will START or STAY ON the medications listed below when I get home from the hospital:    flecainide 150 mg oral tablet  -- 1 tab(s) by mouth every 12 hours  -- Indication: For SVT

## 2023-04-07 NOTE — DISCHARGE NOTE ANTEPARTUM - HOSPITAL COURSE
34yo  @28.2wks admitted for monitoring & management in setting of fetal SVT. Maternal cardiac workup normal with: Maternal TTE (): EF65%, wnl, EKG (): NSR. ATU sono : vtx, posterior no previa,  (4%), UAD wnl   Pediatric Cardiology has been following patient and Peds TTE (): SVT, LV function fair, RV function mildly decreased, tricuspid valve regurge, no signs of hydrops. She was started on Digoxin () with continuous fetal monitoring to improve fetal cardiac function.       Upon discharge, patient is stable without acute complaints. Patient to have close outpatient follow up.

## 2023-04-07 NOTE — PROGRESS NOTE ADULT - SUBJECTIVE AND OBJECTIVE BOX
chief complaint:    Subjective: Patient stated that she had a decrease appetite. Denies lightheadedness, dizziness, CP, palpitation, SOB, abdominal pain, and N/V.      Interval Events:  - Present to [facility] with fetal SVT ( on scan via M mode). MFM and peds cardiology was consulted for fetal SVT.  NST reactive in triage with normal baseline until periods unable to trace. Sono triage providers calculate FHR in the 260s.  Fetal ECHO: SVT, LV function fair, RV mild decrease, no hydrops  - EP was consulted for maternal monitor as the fetal is in SVT. EKG showed SR HR 78BPM and . Ep recommended Digoxin load per peds cardiology protocol. TTE showed EF 65% normal LV function, normal RV size and function, and mild MR.       MEDICATIONS  (STANDING):  heparin   Injectable 5000 Unit(s) SubCutaneous every 12 hours  magnesium sulfate  IVPB 1 Gram(s) IV Intermittent once  potassium chloride    Tablet ER 40 milliEquivalent(s) Oral every 4 hours  prenatal multivitamin 1 Tablet(s) Oral daily    MEDICATIONS  (PRN):      Vital Signs Last 24 Hrs  T(C): 36.6 (07 Apr 2023 03:30), Max: 37.0 (06 Apr 2023 15:44)  T(F): 97.88 (07 Apr 2023 03:30), Max: 98.6 (06 Apr 2023 15:44)  HR: 75 (07 Apr 2023 03:29) (68 - 82)  BP: 100/57 (07 Apr 2023 03:29) (93/58 - 117/64)  BP(mean): --  RR: 16 (07 Apr 2023 03:30) (14 - 18)  SpO2: 100% (06 Apr 2023 09:39) (100% - 100%)      I&O's Detail    06 Apr 2023 07:01  -  07 Apr 2023 07:00  --------------------------------------------------------  IN:    Lactated Ringers: 1000 mL  Total IN: 1000 mL    OUT:  Total OUT: 0 mL    Total NET: 1000 mL          Physical Exam:  GENERAL: Lying in bed, well appearing, speaking in full sentence, in NAD  HEAD:  Atraumatic, Normocephalic  EYES: EOMI, PERRLA, conjunctiva and sclera clear  ENMT: No tonsillar erythema, exudates, or enlargement; Moist mucous membranes, Good dentition, No lesions  NECK: Supple. No JVD or carotid bruit  HEART: S1S2 RRR; No murmurs, rubs, or gallops appreciated  PULMONARY: CTABL, normal respiratory effort.  No rales, wheezing, or rhonchi appreciated bilaterally. No use of accessory muscles  ABDOMEN: + Bowel sounds present, soft, NDNT  EXTREMITIES:  Warm, well -perfused, no pedal edema, distal pulses present  NEUROLOGICAL:AOx3     EKG:  TELEMETERIC:                            9.2    8.12  )-----------( 204      ( 06 Apr 2023 17:45 )             28.6     PTT - ( 06 Apr 2023 11:35 )  PTT:27.9 sec  04-06    136  |  105  |  8   ----------------------------<  98  3.5   |  21<L>  |  0.54    Ca    8.9      06 Apr 2023 17:45  Phos  3.2     04-06  Mg     1.70     04-06    TPro  5.7<L>  /  Alb  3.6  /  TBili  <0.2  /  DBili  x   /  AST  21  /  ALT  15  /  AlkPhos  60  04-06          < from: Transthoracic Echocardiogram (04.06.23 @ 18:51) >  DIMENSIONS:  Dimensions:     Normal Values:  LA:     2.5 cm    2.0 - 4.0 cm  Ao:     2.7 cm    2.0 - 3.8 cm  SEPTUM: 0.7 cm0.6 - 1.2 cm  PWT:    0.7 cm    0.6 - 1.1 cm  LVIDd:  4.9 cm    3.0 - 5.6 cm  LVIDs:  3.0 cm    1.8 - 4.0 cm  Derived Variables:  LVMI: 66 g/m2  RWT: 0.28  Fractional short: 39 %  Ejection Fraction (Smith Rule): 65 %  ------------------------------------------------------------------------  OBSERVATIONS:  Mitral Valve: Normal mitral valve. Mild mitral  regurgitation.  Aortic Root: Normal aortic root.  Aortic Valve: Aortic valve leaflet morphology not well  visualized.  Left Atrium: Normal left atrium.  LA volume index = 17  cc/m2.  Left Ventricle: Normal left ventricular systolic function.  No segmental wall motion abnormalities. Normal left  ventricular internal dimensions and wall thicknesses.  Normal left ventricular diastolic function.  Right Heart: Normal right atrium. Normal right ventricular  size and function. Normal tricuspid valve.  Mild tricuspid  regurgitation. Normal pulmonic valve.  Mild pulmonic  regurgitation.  Pericardium/PleuraNormal pericardium with no pericardial  effusion.  ------------------------------------------------------------------------  CONCLUSIONS:  1. Normal mitral valve. Mild mitral regurgitation.  2. Normal left ventricular internal dimensions and wall  thicknesses.  3. Normal left ventricular systolic function. No segmental  wall motion abnormalities.  4. Normal left ventricular diastolic function.  5. Normal right ventricular size and function.  ------------------------------------------------------------------------  Confirmed on  4/7/2023 -07:11:39 by Get Gallegos M.D.,  [facility], FA    < end of copied text >           Subjective: Patient stated that she had a decrease appetite. Denies lightheadedness, dizziness, CP, palpitation, SOB, abdominal pain, and N/V.      Interval Events:  - Present to [facility] with fetal SVT ( on scan via M mode). MFM and peds cardiology was consulted for fetal SVT.  NST reactive in triage with normal baseline until periods unable to trace. Sono triage providers calculate FHR in the 260s.  Fetal ECHO: SVT, LV function fair, RV mild decrease, no hydrops  - EP was consulted for maternal monitor as the fetal is in SVT. EKG showed SR HR 78BPM and . Ep recommended Digoxin load per peds cardiology protocol. TTE showed EF 65% normal LV function, normal RV size and function, and mild MR.     MEDICATIONS  (STANDING):  heparin   Injectable 5000 Unit(s) SubCutaneous every 12 hours  magnesium sulfate  IVPB 1 Gram(s) IV Intermittent once  potassium chloride    Tablet ER 40 milliEquivalent(s) Oral every 4 hours  prenatal multivitamin 1 Tablet(s) Oral daily    MEDICATIONS  (PRN):      Vital Signs Last 24 Hrs  T(C): 36.6 (07 Apr 2023 03:30), Max: 37.0 (06 Apr 2023 15:44)  T(F): 97.88 (07 Apr 2023 03:30), Max: 98.6 (06 Apr 2023 15:44)  HR: 75 (07 Apr 2023 03:29) (68 - 82)  BP: 100/57 (07 Apr 2023 03:29) (93/58 - 117/64)  BP(mean): --  RR: 16 (07 Apr 2023 03:30) (14 - 18)  SpO2: 100% (06 Apr 2023 09:39) (100% - 100%)      I&O's Detail    06 Apr 2023 07:01  -  07 Apr 2023 07:00  --------------------------------------------------------  IN:    Lactated Ringers: 1000 mL  Total IN: 1000 mL    OUT:  Total OUT: 0 mL    Total NET: 1000 mL          Physical Exam:  GENERAL: Lying in bed, well appearing, speaking in full sentence, in NAD  HEART: S1S2 RRR  PULMONARY: CTABL, normal respiratory effort  ABDOMEN: + Bowel sounds present, soft  EXTREMITIES:  Warm, well -perfused, no pedal edema, distal pulses present  NEUROLOGICAL:AOx3     TELEMETERIC: SR HR 70-90s                            9.2    8.12  )-----------( 204      ( 06 Apr 2023 17:45 )             28.6     PTT - ( 06 Apr 2023 11:35 )  PTT:27.9 sec  04-06    136  |  105  |  8   ----------------------------<  98  3.5   |  21<L>  |  0.54    Ca    8.9      06 Apr 2023 17:45  Phos  3.2     04-06  Mg     1.70     04-06    TPro  5.7<L>  /  Alb  3.6  /  TBili  <0.2  /  DBili  x   /  AST  21  /  ALT  15  /  AlkPhos  60  04-06          < from: Transthoracic Echocardiogram (04.06.23 @ 18:51) >  DIMENSIONS:  Dimensions:     Normal Values:  LA:     2.5 cm    2.0 - 4.0 cm  Ao:     2.7 cm    2.0 - 3.8 cm  SEPTUM: 0.7 cm0.6 - 1.2 cm  PWT:    0.7 cm    0.6 - 1.1 cm  LVIDd:  4.9 cm    3.0 - 5.6 cm  LVIDs:  3.0 cm    1.8 - 4.0 cm  Derived Variables:  LVMI: 66 g/m2  RWT: 0.28  Fractional short: 39 %  Ejection Fraction (Smith Rule): 65 %  ------------------------------------------------------------------------  OBSERVATIONS:  Mitral Valve: Normal mitral valve. Mild mitral  regurgitation.  Aortic Root: Normal aortic root.  Aortic Valve: Aortic valve leaflet morphology not well  visualized.  Left Atrium: Normal left atrium.  LA volume index = 17  cc/m2.  Left Ventricle: Normal left ventricular systolic function.  No segmental wall motion abnormalities. Normal left  ventricular internal dimensions and wall thicknesses.  Normal left ventricular diastolic function.  Right Heart: Normal right atrium. Normal right ventricular  size and function. Normal tricuspid valve.  Mild tricuspid  regurgitation. Normal pulmonic valve.  Mild pulmonic  regurgitation.  Pericardium/PleuraNormal pericardium with no pericardial  effusion.  ------------------------------------------------------------------------  CONCLUSIONS:  1. Normal mitral valve. Mild mitral regurgitation.  2. Normal left ventricular internal dimensions and wall  thicknesses.  3. Normal left ventricular systolic function. No segmental  wall motion abnormalities.  4. Normal left ventricular diastolic function.  5. Normal right ventricular size and function.  ------------------------------------------------------------------------  Confirmed on  4/7/2023 -07:11:39 by Get Gallegos M.D.,  [facility], FA    < end of copied text >

## 2023-04-07 NOTE — PROGRESS NOTE ADULT - ASSESSMENT
32yo  @28.2wks admitted for monitoring & management in setting of fetal SVT. Maternal status reassuring overnight.    #fetal SVT  - Peds cards recs: CFM overnight to determine frequency of SVT, BID NSTs, adult cardio consult, treatment pending above recs  - Fetal ECHO: SVT, LV function fair, RV mild decrease, no hydrops  - Cards recs: telemetry, monitor electrolytes K to 4, Mg to 2, TTE    #Fetal wellbeing   - NST BID  - NICU consult prn  - ATU : vtx, posterior no previa,  (4%), UAD wnl     #Maternal wellbeing   - PNV  - Regular diet  - SCDs and ambulation for DTV prophylaxis     Katherine, PGY3 32yo  @28.2wks admitted for monitoring & management in setting of fetal SVT. Maternal status reassuring overnight.    #fetal SVT  - Peds cards recs: CFM overnight to determine frequency of SVT, BID NSTs, adult cardio consult, treatment pending above recs  - Fetal ECHO: SVT, LV function fair, RV mild decrease, no hydrops  - Cards recs: telemetry, monitor electrolytes K to 4, Mg to 2, TTE    #Fetal wellbeing   - NST BID  - NICU consult prn  - ATU : vtx, posterior no previa,  (4%), UAD wnl     #Maternal wellbeing   - PNV  - Regular diet  - SCDs and ambulation for DTV prophylaxis     HERNÁN MurphyY3    [facility] Addendum   32yo  @ 28w 2days admitted for fetal SVT. Patient has been seen by adult EP and pediatric cardiology. As per a maternal standpoint, her TTE was wnl. She has been unable to tolerate a regular diet as she does not like the meal in the hospital, plan to supplement with ensure. Patient was counseled on treatement for fetal SVT and importance of monitoring Ms. Walters for any arrythmia with telemetry. She has been seen by peds cards and understands the treatment plan. Patient otherwise feeling well, reports normal fetal movement. Plan to follow digoxin protocol as per peds cards and continue EFM and telemetry. Continuous EFM is to assess for response to treatment.   Carly Hirschberg, MFM Fellow   Patient seen and examined with JOSTIN Macario Attending

## 2023-04-07 NOTE — CHART NOTE - NSCHARTNOTEFT_GEN_A_CORE
Administered 2mg digoxin over 5min period. Pt tolerated well. Will administer next dose in 6hrs.     Katherine, PGY3 Administered 500mcg digoxin over 5min period. Pt tolerated well. Will administer next dose in 6hrs.     Katherine, PGY3

## 2023-04-07 NOTE — DISCHARGE NOTE ANTEPARTUM - PLAN OF CARE
Full recovery Take Flecainide as prescribed. Call the OBGYN office to schedule a follow-up in the clinic this week. Follow up with pediatric cardiology next week. Follow-up with cardiology (Dr. Hood) on 4/13 at 10am and with Dr. Richards on 4/17 at 12pm. Follow-up with Dr. Springer (electrophysiology) on 4/26 at 11am.

## 2023-04-07 NOTE — DISCHARGE NOTE ANTEPARTUM - ADDITIONAL INSTRUCTIONS
Resume normal prenatal care. Please call your doctor with any questions or concerns. Please report back to the hospital if you experience fevers, chills, nausea, vomiting, painful contractions, vaginal bleeding, loss of fluid or if you experience decreased fetal movement. Take Flecainide as prescribed. Call the OBGYN office to schedule a follow-up in the clinic this week. Follow up with pediatric cardiology next week. Follow-up with cardiology (Dr. Hood) on 4/13 at 10am and with Dr. Richards on 4/17 at 12pm. Follow-up with Dr. Mcpherson (electrophysiology) on 4/26 at 11am. Resume normal prenatal care. Please call your doctor with any questions or concerns. Please report back to the hospital if you experience fevers, chills, nausea, vomiting, painful contractions, vaginal bleeding, loss of fluid or if you experience decreased fetal movement.

## 2023-04-08 LAB
ANION GAP SERPL CALC-SCNC: 12 MMOL/L — SIGNIFICANT CHANGE UP (ref 7–14)
BUN SERPL-MCNC: 8 MG/DL — SIGNIFICANT CHANGE UP (ref 7–23)
CALCIUM SERPL-MCNC: 9.1 MG/DL — SIGNIFICANT CHANGE UP (ref 8.4–10.5)
CHLORIDE SERPL-SCNC: 102 MMOL/L — SIGNIFICANT CHANGE UP (ref 98–107)
CO2 SERPL-SCNC: 20 MMOL/L — LOW (ref 22–31)
CREAT SERPL-MCNC: 0.53 MG/DL — SIGNIFICANT CHANGE UP (ref 0.5–1.3)
CULTURE RESULTS: SIGNIFICANT CHANGE UP
DIGOXIN SERPL-MCNC: 2.1 NG/ML — HIGH (ref 0.8–2)
EGFR: 126 ML/MIN/1.73M2 — SIGNIFICANT CHANGE UP
GLUCOSE SERPL-MCNC: 78 MG/DL — SIGNIFICANT CHANGE UP (ref 70–99)
GROUP B BETA STREP DNA (PCR): SIGNIFICANT CHANGE UP
MAGNESIUM SERPL-MCNC: 1.8 MG/DL — SIGNIFICANT CHANGE UP (ref 1.6–2.6)
PHOSPHATE SERPL-MCNC: 3.6 MG/DL — SIGNIFICANT CHANGE UP (ref 2.5–4.5)
POTASSIUM SERPL-MCNC: 3.8 MMOL/L — SIGNIFICANT CHANGE UP (ref 3.5–5.3)
POTASSIUM SERPL-SCNC: 3.8 MMOL/L — SIGNIFICANT CHANGE UP (ref 3.5–5.3)
SODIUM SERPL-SCNC: 134 MMOL/L — LOW (ref 135–145)
SOURCE GROUP B STREP: SIGNIFICANT CHANGE UP
SPECIMEN SOURCE: SIGNIFICANT CHANGE UP

## 2023-04-08 PROCEDURE — 99233 SBSQ HOSP IP/OBS HIGH 50: CPT

## 2023-04-08 PROCEDURE — 99232 SBSQ HOSP IP/OBS MODERATE 35: CPT | Mod: GC,25

## 2023-04-08 PROCEDURE — 93010 ELECTROCARDIOGRAM REPORT: CPT

## 2023-04-08 RX ORDER — METOPROLOL TARTRATE 50 MG
25 TABLET ORAL DAILY
Refills: 0 | Status: DISCONTINUED | OUTPATIENT
Start: 2023-04-08 | End: 2023-04-09

## 2023-04-08 RX ORDER — FLECAINIDE ACETATE 50 MG
150 TABLET ORAL
Refills: 0 | Status: DISCONTINUED | OUTPATIENT
Start: 2023-04-08 | End: 2023-04-10

## 2023-04-08 RX ADMIN — Medication 500 MICROGRAM(S): at 05:54

## 2023-04-08 RX ADMIN — Medication 25 MILLIGRAM(S): at 13:48

## 2023-04-08 RX ADMIN — Medication 150 MILLIGRAM(S): at 13:48

## 2023-04-08 RX ADMIN — Medication 1 TABLET(S): at 18:38

## 2023-04-08 NOTE — PROGRESS NOTE ADULT - TIME-BASED BILLING (NON-CRITICAL CARE)
Time-based billing (NON-critical care)
Time-based billing (NON-critical care)
Gabapentin Counseling: I discussed with the patient the risks of gabapentin including but not limited to dizziness, somnolence, fatigue and ataxia.

## 2023-04-08 NOTE — PROGRESS NOTE PEDS - ASSESSMENT
Ms. Walters is a 34 yo woman, , currently at 28 2/7 weeks gestation.  Her fetus has been found to be in supraventricular tachycardia (possibly AVNRT).  There are no signs of hydrops at this time. SVT broke around 6pm last night and remained in NSR most of the night except for ~3-6am, but now since 8am back in SVT. Will switch to Flecainide treatment today    Recommendation:  Will initiate treatment per [facility] Fetal Tachycardia guidelines given to services.  FLECAINIDE: start at 150 mg po BID   - If Digoxin given, Drug given > 6 hours after last digoxin dose  - Fetal Echos prior to initiating therapy and then twice per week; more frequent if there is an abnormal finding on BPP or rhythm changes.  - Maternal Echo prior to initiating therapy to r/o maternal heart disease or myopathy  - continued telemetry (recording significant ectopy)  - continued daily maternal ECG’s (recording daily rates, AV conduction, DE, QRS, and QTc’s)  - daily BPP (recording scores and abnormal fluid collections)  - continued near continuous fetal heart monitoring  - when at steady state prior to d/c or change in drug, drug trough level;  - drug continued until conversion to NSR, acceptable rate control, or until safety threshold reached.    - Plan discussed with Dr. Sanz Pediatric EP Attending; discussed with MFM attending and Adult cardiology  - Adult cardiology to order Flecainide on the patient   -Please contact pediatric cardiology if any questions or concerns Ms. Walters is a 32 yo woman, , currently at 28 2/7 weeks gestation.  Her fetus has been found to be in supraventricular tachycardia (possibly AVNRT).  There are no signs of hydrops at this time. SVT broke around 6pm last night and remained in NSR most of the night except for ~3-6am, but now since 8am back in SVT. Will switch to Flecainide treatment today    Recommendation:  Will initiate treatment per [facility] Fetal Tachycardia guidelines given to services.  FLECAINIDE: start at 150 mg po BID     - Fetal Echos prior to initiating therapy and then twice per week; more frequent if there is an abnormal finding on BPP or rhythm changes.  - Maternal Echo prior to initiating therapy to r/o maternal heart disease or myopathy  - continued telemetry (recording significant ectopy)  - continued daily maternal ECG’s (recording daily rates, AV conduction, NM, QRS, and QTc’s)  - daily BPP (recording scores and abnormal fluid collections)  - continued near continuous fetal heart monitoring  - when at steady state prior to d/c or change in drug, drug trough level;  - drug continued until conversion to NSR, acceptable rate control, or until safety threshold reached.    - Plan discussed with Dr. Sanz Pediatric EP Attending; discussed with MFM attending and Adult cardiology  - Adult cardiology to order Flecainide on the patient   - Please contact pediatric cardiology if any questions or concerns

## 2023-04-08 NOTE — PROGRESS NOTE ADULT - TIME BILLING
Complexity of care.     I saw and evaluated Ms. Walters along with Pediatric Cardiology Fellow and Attending.   Agree with above.   Bedside BPP performed by me: cephalic, MVP 3.6cm, BPP 8/8, no signs of fetal hydrops.  - 263 bpm on m-mode imaging.   Appreciate Cardiology recommendations to transition from Digoxin to Flecainide.   Maternal telemetry.   Twice daily BPP and NST.   Continue inpatient observation with close maternal and fetal surveillance.   All questions answered.   Ino BOTELLO
This is a high complexity pt with maternal/fetal findings that increase complexity of medical decision making. The time was spent counseling, educating, reviewing previous notes and tests, coordinating care and documenting. I agreed with the history, physical exam and plan as documented by NP/resident/fellow.

## 2023-04-08 NOTE — CHART NOTE - NSCHARTNOTEFT_GEN_A_CORE
Administered 500mcg digoxin over 5min period. Pt tolerated well. Will draw labs in 4-6hrs.    Katherine, PGY3

## 2023-04-08 NOTE — CHART NOTE - NSCHARTNOTEFT_GEN_A_CORE
Fetus with SVT as per Ped card.  Discontinued digoxin, started flecainide 150mg PO BID and Toprol xl 25mg daily. Fetus with SVT as per Ped Card.  Discontinued digoxin, started flecainide 150mg PO BID and Toprol xl 25mg daily.  Please continue to monitor on tele, repeat 12 leads ECG, and daily am 12 leads ECG.

## 2023-04-08 NOTE — CHART NOTE - NSCHARTNOTEFT_GEN_A_CORE
Telemetry events reviewed Sinus rhythm 70's no evidence of QT prolongation.     Please obtain daily 12 lead EKG to be reviewed by cardiology.    Ananda Romeo MD  Cardiology Fellow      Please check [email or web address]: [email or web address]" for cardiology service schedule and contact information.

## 2023-04-08 NOTE — PROGRESS NOTE ADULT - SUBJECTIVE AND OBJECTIVE BOX
R3 Antepartum Note, HD#3    Interval events:    Patient seen and examined at bedside. S/p digoxin IVP x2 overnight for fetal SVT. No acute complaints. Pt reports +FM, denies LOF, VB, ctx, HA, epigastric pain, blurred vision, CP, SOB, N/V, fevers, and chills.    Vital Signs Last 24 Hours  T(C): 36.7 (04-08-23 @ 06:32), Max: 36.9 (04-07-23 @ 13:32)  HR: 68 (04-08-23 @ 06:32) (65 - 77)  BP: 104/57 (04-08-23 @ 06:32) (96/57 - 125/58)  RR: 18 (04-08-23 @ 06:32) (16 - 18)  SpO2: 100% (04-08-23 @ 06:32) (99% - 100%)    Physical Exam:  General: NAD  Abdomen: Soft, non-tender, gravid  Ext: No pain or swelling    Labs:             9.2    8.12  )-----------( 204      ( 04-06 @ 17:45 )             28.6     04-08 @ 06:36    134  |  102  |  8   ----------------------------<  78  3.8   |  20  |  0.53    Ca    9.1      04-08 @ 06:36  Phos  3.6     04-08 @ 06:36  Mg     1.80     04-08 @ 06:36    TPro  5.7  /  Alb  3.6  /  TBili  <0.2  /  DBili  x   /  AST  21  /  ALT  15  /  AlkPhos  60  04-06 @ 17:45      PTT - ( 04-06 @ 11:35 )  PTT:27.9 sec    Uric Acid: (04-06 @ 11:35)  2.9      Fibrinogen: (04-06 @ 11:35)  --       LDH: (04-06 @ 11:35)  --         MEDICATIONS  (STANDING):  heparin   Injectable 5000 Unit(s) SubCutaneous every 12 hours  prenatal multivitamin 1 Tablet(s) Oral daily     R3 Antepartum Note, HD#3    Interval events:    Patient seen and examined at bedside. S/p digoxin IVP x2 overnight for fetal SVT. No acute complaints. Pt reports +FM, denies LOF, VB, ctx, HA, epigastric pain, blurred vision, CP, SOB, N/V, fevers, and chills.    Vital Signs Last 24 Hours  T(C): 36.7 (04-08-23 @ 06:32), Max: 36.9 (04-07-23 @ 13:32)  HR: 68 (04-08-23 @ 06:32) (65 - 77)  BP: 104/57 (04-08-23 @ 06:32) (96/57 - 125/58)  RR: 18 (04-08-23 @ 06:32) (16 - 18)  SpO2: 100% (04-08-23 @ 06:32) (99% - 100%)    Physical Exam:  General: NAD  Abdomen: Soft, non-tender, gravid  Ext: No pain or swelling    Labs:             9.2    8.12  )-----------( 204      ( 04-06 @ 17:45 )             28.6     04-08 @ 06:36    134  |  102  |  8   ----------------------------<  78  3.8   |  20  |  0.53    Ca    9.1      04-08 @ 06:36  Phos  3.6     04-08 @ 06:36  Mg     1.80     04-08 @ 06:36    TPro  5.7  /  Alb  3.6  /  TBili  <0.2  /  DBili  x   /  AST  21  /  ALT  15  /  AlkPhos  60  04-06 @ 17:45      PTT - ( 04-06 @ 11:35 )  PTT:27.9 sec    Uric Acid: (04-06 @ 11:35)  2.9        MEDICATIONS  (STANDING):  heparin   Injectable 5000 Unit(s) SubCutaneous every 12 hours  prenatal multivitamin 1 Tablet(s) Oral daily

## 2023-04-08 NOTE — PROGRESS NOTE ADULT - ASSESSMENT
32yo  @28.3wks admitted for monitoring & management in setting of fetal SVT. Pt s/p digoxin overnight. Fetus remains in SVT.     #fetal SVT  - Peds cards recs:   Digitalization with 0.5mg iv q 8 h x 3.   Digoxin level 4-6 hrs post third dose.  Added boluses to achieve maternal level   Daily 12-lead ECG’s   Near continuous Fetal heart monitoring   Daily BPP’s   Repeat fetal echo if tachycardia terminates or if negative finding on BPP   - Fetal ECHO: SVT, LV function fair, RV mild decrease, no hydrops  - Cards recs: telemetry, monitor electrolytes K to 4, Mg to 2    #Fetal wellbeing   - NST BID  - NICU consult prn  - ATU : vtx, posterior no previa,  (4%), UAD wnl     #Maternal wellbeing   - PNV  - Regular diet  - SCDs and ambulation for DTV prophylaxis     Katherine, PGY3

## 2023-04-09 LAB
ANION GAP SERPL CALC-SCNC: 10 MMOL/L — SIGNIFICANT CHANGE UP (ref 7–14)
BLD GP AB SCN SERPL QL: NEGATIVE — SIGNIFICANT CHANGE UP
BUN SERPL-MCNC: 8 MG/DL — SIGNIFICANT CHANGE UP (ref 7–23)
CALCIUM SERPL-MCNC: 9.2 MG/DL — SIGNIFICANT CHANGE UP (ref 8.4–10.5)
CHLORIDE SERPL-SCNC: 103 MMOL/L — SIGNIFICANT CHANGE UP (ref 98–107)
CO2 SERPL-SCNC: 21 MMOL/L — LOW (ref 22–31)
CREAT SERPL-MCNC: 0.63 MG/DL — SIGNIFICANT CHANGE UP (ref 0.5–1.3)
EGFR: 121 ML/MIN/1.73M2 — SIGNIFICANT CHANGE UP
GLUCOSE SERPL-MCNC: 81 MG/DL — SIGNIFICANT CHANGE UP (ref 70–99)
MAGNESIUM SERPL-MCNC: 1.8 MG/DL — SIGNIFICANT CHANGE UP (ref 1.6–2.6)
PHOSPHATE SERPL-MCNC: 3.6 MG/DL — SIGNIFICANT CHANGE UP (ref 2.5–4.5)
POTASSIUM SERPL-MCNC: 3.6 MMOL/L — SIGNIFICANT CHANGE UP (ref 3.5–5.3)
POTASSIUM SERPL-SCNC: 3.6 MMOL/L — SIGNIFICANT CHANGE UP (ref 3.5–5.3)
RH IG SCN BLD-IMP: POSITIVE — SIGNIFICANT CHANGE UP
SODIUM SERPL-SCNC: 134 MMOL/L — LOW (ref 135–145)

## 2023-04-09 PROCEDURE — 99232 SBSQ HOSP IP/OBS MODERATE 35: CPT | Mod: GC,25

## 2023-04-09 PROCEDURE — 93010 ELECTROCARDIOGRAM REPORT: CPT

## 2023-04-09 RX ADMIN — Medication 150 MILLIGRAM(S): at 00:00

## 2023-04-09 RX ADMIN — Medication 150 MILLIGRAM(S): at 11:07

## 2023-04-09 RX ADMIN — Medication 150 MILLIGRAM(S): at 23:05

## 2023-04-09 RX ADMIN — Medication 1 TABLET(S): at 11:07

## 2023-04-09 NOTE — PROGRESS NOTE ADULT - ASSESSMENT
33 year old woman 28 weeks  with a pmhx of JUANCARLOS who presented with fetal SVT ( on scan via M mode). Patient admitted to nausea during her pregnancy which she used marijuana to help control her nausea. Patient does not have cardiologist and stated that she never had a echocardiogram.  EP was consulted for maternal monitor as the fetal is in SVT. EKG showed SR w 1ST degree  AVB HR 71BPM and        Plan:  - Continuous telemetric monitoring  - Monitor electrolytes and replete K to 4 and Mg to 2  - Monitor renal function   - TTE showed EF 65% normal LV function, normal RV size and function, and mild MR.   - Discontinued digoxin, started flecainide 150mg PO BID and Toprol xl 25mg daily.  Please continue to monitor on tele, repeat 12 leads ECG, and daily am 12 leads ECG.  -  surveillance and care per MFM, OBGYN, and peds cardiology   33 year old woman 28 weeks  with a pmhx of JUANCARLOS who presented with fetal SVT ( on scan via M mode). Patient admitted to nausea during her pregnancy which she used marijuana to help control her nausea. Patient does not have cardiologist and stated that she never had a echocardiogram.  EP was consulted for maternal monitor as the fetal is in SVT. EKG showed SR w 1ST degree  AVB HR 71BPM and        Plan:  - Continuous telemetric monitoring  - Monitor electrolytes and replete K to 4 and Mg to 2  - Monitor renal function   - TTE showed EF 65% normal LV function, normal RV size and function, and mild MR.   - Discontinued digoxin, started flecainide 150mg PO BID. PLEASE HOLD Toprol xl 25mg for today given 1st degree AVB. Will reassess daily.   - Please continue to monitor on tele, repeat 12 leads ECG, and daily am 12 leads ECG.  -  surveillance and care per MFM, OBGYN, and peds cardiology

## 2023-04-09 NOTE — PROGRESS NOTE ADULT - SUBJECTIVE AND OBJECTIVE BOX
R3 Antepartum Note, HD#4    Patient seen and examined at bedside, no acute overnight events. No acute complaints. Pt reports +FM, denies LOF, VB, ctx, HA, epigastric pain, blurred vision, CP, SOB, N/V, fevers, and chills.    Vital Signs Last 24 Hours  T(C): 36.9 (04-09-23 @ 01:25), Max: 37.1 (04-08-23 @ 17:55)  HR: 68 (04-09-23 @ 01:25) (66 - 85)  BP: 101/56 (04-09-23 @ 01:25) (101/56 - 117/69)  RR: 18 (04-09-23 @ 01:25) (18 - 18)  SpO2: 100% (04-09-23 @ 01:25) (99% - 100%)    CAPILLARY BLOOD GLUCOSE          Physical Exam:  General: NAD  Abdomen: Soft, non-tender, gravid  Ext: No pain or swelling    NST reactive overnight    Labs:  04-08 @ 06:36    134  |  102  |  8   ----------------------------<  78  3.8   |  20  |  0.53    Ca    9.1      04-08 @ 06:36  Phos  3.6     04-08 @ 06:36  Mg     1.80     04-08 @ 06:36        PTT - ( 04-06 @ 11:35 )  PTT:27.9 sec    Uric Acid: (04-06 @ 11:35)  2.9      Fibrinogen: (04-06 @ 11:35)  --       LDH: (04-06 @ 11:35)  --         MEDICATIONS  (STANDING):  flecainide 150 milliGRAM(s) Oral two times a day  heparin   Injectable 5000 Unit(s) SubCutaneous every 12 hours  metoprolol succinate ER 25 milliGRAM(s) Oral daily  prenatal multivitamin 1 Tablet(s) Oral daily

## 2023-04-09 NOTE — PROGRESS NOTE ADULT - ASSESSMENT
34yo  @28.3wks admitted for monitoring & management in setting of fetal SVT. Pt s/p digoxin overnight. Fetus remains in SVT.     #fetal SVT  - Peds cards recs:   s/p Digitalization with 0.5mg iv q 8 h x 3 Digoxin   Start flecainide 150mg PO BID and Toprol xl 25mg daily (-)  Daily 12-lead ECG’s   Near continuous Fetal heart monitoring   Daily BPP’s   Repeat fetal echo if tachycardia terminates or if negative finding on BPP   - Fetal ECHO: SVT, LV function fair, RV mild decrease, no hydrops  - Cards recs: telemetry, monitor electrolytes K to 4, Mg to 2    #Fetal wellbeing   - NST BID  - NICU consult prn  - ATU : vtx, posterior no previa,  (4%), UAD wnl     #Maternal wellbeing   - CBC, BMP/Mg/P daily, replete electrolytes  - PNV  - Regular diet  - SCDs and ambulation for DTV prophylaxis     Rhianna Moore, PGY3

## 2023-04-09 NOTE — PROGRESS NOTE ADULT - SUBJECTIVE AND OBJECTIVE BOX
Interval History: started on flecainide and metoprolol     Medications:  flecainide 150 milliGRAM(s) Oral two times a day  heparin   Injectable 5000 Unit(s) SubCutaneous every 12 hours  metoprolol succinate ER 25 milliGRAM(s) Oral daily  prenatal multivitamin 1 Tablet(s) Oral daily      Vitals:  T(C): 36.3 (04-09-23 @ 06:03), Max: 37.1 (04-08-23 @ 17:55)  HR: 76 (04-09-23 @ 06:03) (66 - 85)  BP: 101/57 (04-09-23 @ 06:03) (101/56 - 117/69)  RR: 18 (04-09-23 @ 06:03) (18 - 18)  SpO2: 100% (04-09-23 @ 06:03) (99% - 100%)         Physical Exam:  Appearance: No Acute Distress  HEENT: No JVD  Cardiovascular: Normal S1 S2, No murmurs/rubs/gallops  Respiratory: Clear to auscultation bilaterally  Gastrointestinal: Soft, Non-tender	  Skin: No cyanosis	  Neurologic: Non-focal  Extremities: No LE edema  Psychiatry: A & O x 3, Mood & affect appropriate    Labs:    04-09    134<L>  |  103  |  8   ----------------------------<  81  3.6   |  21<L>  |  0.63    Ca    9.2      09 Apr 2023 06:07  Phos  3.6     04-09  Mg     1.80     04-09               TELEMETRY: sinus with 1st degree avb

## 2023-04-09 NOTE — PROGRESS NOTE ADULT - ASSESSMENT
A/P: 32yo  @28w3d admitted for monitoring & management in setting of fetal SVT completed digoxin now on flecanide. VSS. Asxs. EFM reassuring. No evidence of SVT on m-mode sonography today. Appears that fetus is less in SVT and more in sinus rythm on this new regiment. Also continues on metroprolol. Daily EKG. Telemetry. Will cnt to monitor closely.    Patient seen with Dr. Canada ([facility] attending)    Cassius White M.D. FACOG PGY-6  Maternal Fetal Medicine Fellow  Cell: [phone] if after 5pm or weekend ask labor and delivery for on call fellow   A/P: 34yo  @28w3d admitted for monitoring & management in setting of fetal SVT completed digoxin now on flecanide. VSS. Asxs. EFM reassuring. No evidence of SVT on m-mode sonography today. Appears that fetus is less in SVT and more in sinus rhythm on this new regimen. Also continues on metroprolol. Daily EKG. Telemetry. Will cnt to monitor closely.    Patient seen with Dr. Canada ([facility] attending)    Cassius White M.D. FACOG PGY-6  Maternal Fetal Medicine Fellow  Cell: [phone] if after 5pm or weekend ask labor and delivery for on call fellow

## 2023-04-10 VITALS
RESPIRATION RATE: 17 BRPM | HEART RATE: 82 BPM | OXYGEN SATURATION: 100 % | TEMPERATURE: 98 F | DIASTOLIC BLOOD PRESSURE: 67 MMHG | SYSTOLIC BLOOD PRESSURE: 112 MMHG

## 2023-04-10 LAB
ANION GAP SERPL CALC-SCNC: 11 MMOL/L — SIGNIFICANT CHANGE UP (ref 7–14)
BUN SERPL-MCNC: 8 MG/DL — SIGNIFICANT CHANGE UP (ref 7–23)
CALCIUM SERPL-MCNC: 8.9 MG/DL — SIGNIFICANT CHANGE UP (ref 8.4–10.5)
CHLORIDE SERPL-SCNC: 102 MMOL/L — SIGNIFICANT CHANGE UP (ref 98–107)
CO2 SERPL-SCNC: 20 MMOL/L — LOW (ref 22–31)
CREAT SERPL-MCNC: 0.51 MG/DL — SIGNIFICANT CHANGE UP (ref 0.5–1.3)
EGFR: 127 ML/MIN/1.73M2 — SIGNIFICANT CHANGE UP
GLUCOSE SERPL-MCNC: 82 MG/DL — SIGNIFICANT CHANGE UP (ref 70–99)
MAGNESIUM SERPL-MCNC: 1.7 MG/DL — SIGNIFICANT CHANGE UP (ref 1.6–2.6)
PHOSPHATE SERPL-MCNC: 3.3 MG/DL — SIGNIFICANT CHANGE UP (ref 2.5–4.5)
POTASSIUM SERPL-MCNC: 3.6 MMOL/L — SIGNIFICANT CHANGE UP (ref 3.5–5.3)
POTASSIUM SERPL-SCNC: 3.6 MMOL/L — SIGNIFICANT CHANGE UP (ref 3.5–5.3)
SODIUM SERPL-SCNC: 133 MMOL/L — LOW (ref 135–145)

## 2023-04-10 PROCEDURE — 76826 ECHO EXAM OF FETAL HEART: CPT | Mod: 26

## 2023-04-10 PROCEDURE — 76828 ECHO EXAM OF FETAL HEART: CPT | Mod: 26

## 2023-04-10 PROCEDURE — 99231 SBSQ HOSP IP/OBS SF/LOW 25: CPT | Mod: GC

## 2023-04-10 PROCEDURE — 99232 SBSQ HOSP IP/OBS MODERATE 35: CPT | Mod: 25

## 2023-04-10 PROCEDURE — 93010 ELECTROCARDIOGRAM REPORT: CPT

## 2023-04-10 PROCEDURE — 99231 SBSQ HOSP IP/OBS SF/LOW 25: CPT

## 2023-04-10 PROCEDURE — 93325 DOPPLER ECHO COLOR FLOW MAPG: CPT | Mod: 26

## 2023-04-10 RX ORDER — POTASSIUM CHLORIDE 20 MEQ
40 PACKET (EA) ORAL EVERY 4 HOURS
Refills: 0 | Status: COMPLETED | OUTPATIENT
Start: 2023-04-10 | End: 2023-04-10

## 2023-04-10 RX ORDER — MAGNESIUM SULFATE 500 MG/ML
1 VIAL (ML) INJECTION ONCE
Refills: 0 | Status: COMPLETED | OUTPATIENT
Start: 2023-04-10 | End: 2023-04-10

## 2023-04-10 RX ORDER — FLECAINIDE ACETATE 50 MG
1 TABLET ORAL
Qty: 28 | Refills: 0
Start: 2023-04-10 | End: 2023-04-23

## 2023-04-10 RX ADMIN — Medication 40 MILLIEQUIVALENT(S): at 11:57

## 2023-04-10 RX ADMIN — Medication 1 TABLET(S): at 11:56

## 2023-04-10 RX ADMIN — Medication 150 MILLIGRAM(S): at 11:57

## 2023-04-10 RX ADMIN — Medication 40 MILLIEQUIVALENT(S): at 08:51

## 2023-04-10 RX ADMIN — Medication 100 GRAM(S): at 10:35

## 2023-04-10 NOTE — PROVIDER CONTACT NOTE (OTHER) - ACTION/TREATMENT ORDERED:
will continue to education patient of the importance of cardiac monitoring in the setting of fetal tachycardia
awaiting reccomendations from cardiology and [facility]

## 2023-04-10 NOTE — PROGRESS NOTE ADULT - ATTENDING COMMENTS
MFM ATTENDING    Appreciate peds cardiology and EP input.   Fetal SVT controlled on flecainide    OK to dc home today  Follow up at 1111 later this week  Team to arrange f/u with peds cards and EP
Complexity of care.   Discussed with Pediatric Cardiology team.   Agree with above.   Anneliese Canada MD
Patient with fetal SVT - stable, no maternal complaints.  Reviewed case with adult/ped EP and cards.  Will plan to start digoxin load and fetal monitoring for conversion of rhythm.  Reviewed with team that if patient needs a break off fetal monitor ok to come off.  Needs maternal telemetry during loading phase of digoxin.  Further plan pending fetal response to digoxin.

## 2023-04-10 NOTE — PROGRESS NOTE ADULT - ASSESSMENT
34yo  @28w4d admitted for monitoring & management in setting of fetal SVT. Pt s/p digoxin -. Maternal and fetal status reassuring overnight.    #fetal SVT  - Peds cards recs:   s/p Digitalization with 0.5mg iv q 8 h x 3 Digoxin   Continue flecainide 150mg PO BID and Toprol xl 25mg daily (-)  Daily 12-lead ECG’s   Near continuous Fetal heart monitoring   Daily BPP’s   Repeat fetal echo if tachycardia terminates or if negative finding on BPP   - Fetal ECHO: SVT, LV function fair, RV mild decrease, no hydrops  - Cards recs: telemetry, monitor electrolytes K to 4, Mg to 2    #Fetal wellbeing   - Continuous fetal monitoring  - NICU consult PRN  - ATU : vtx, posterior no previa,  (4%), UAD wnl     #Maternal wellbeing   - Continuous telemetry   - CBC, BMP/Mg/P daily, replete electrolytes  - PNV  - Regular diet  - SCDs and ambulation for DTV prophylaxis     Candis Goff PGY3

## 2023-04-10 NOTE — PROGRESS NOTE ADULT - NS ATTEND AMEND GEN_ALL_CORE FT
33 year old woman 28 weeks  with a pmhx of JUANCARLOS who presented with fetal SVT ( on scan via M mode). Patient admitted to nausea during her pregnancy which she used marijuana to help control her nausea.  EP was consulted for maternal monitor as the fetal is in SVT. s/p Digoxin IV 0.5mg  x3 (ineffective) now switched to Flecainide 150mg bid (also received low dose Metoprolol 25 x1 which was d/cd after EKG showed SR 71 w 1ST degree  AVB.  QT/QTc 382/448 ms this am (baseline Qt/QTc noted on : 384/437).  Fetal HR now in 125-130m bpm. TTE showed EF 65% normal LV function, normal RV size and function, and mild MR. Continue  flecainide 150mg PO BID. d/cd  Toprol xl 25mg given 1st degree AVB (1 st degree AVB resolved off BB). Continue to monitor on tele, daily 12 leads ECG to check Qt/QTc if remains in hospital. Continue care per MFM, OBGYN, and peds cardiology

## 2023-04-10 NOTE — PROGRESS NOTE ADULT - ASSESSMENT
A/P: 34yo  at 28w5d presenting with fetal SVT and found to have IUGR 4%ile, now on flecanide with fetus in sinus rhythm. Patient has been followed by adult EP and on telemetry. She was on metoprolol however given 1st degree heart block it was discontinued and the heart block resolved. She has had normal rhythm now off metoprolol. Peds cards completed a fetal ECHO this morning showing normal sinus rhythm. Plan for discharge home today with close follow up outpatient.   - Recommend weekly fetal ECHO - alternating MFM and peds cards   - Continue flecanide as an outpatient   - Plan for serial growths and twice weekly APT     Carly Hirschberg, MFM Fellow   Patient seen and examined with JOSTIN Zepeda Attending

## 2023-04-10 NOTE — PROGRESS NOTE ADULT - SUBJECTIVE AND OBJECTIVE BOX
R3 Antepartum Note    HD#5    Patient seen and examined at bedside, no acute overnight events. No acute complaints. Pt reports +FM, denies LOF, VB, ctx, HA, epigastric pain, blurred vision, CP, palpitations, SOB, N/V, fevers, and chills.    Vital Signs Last 24 Hours  T(C): 36.7 (04-10-23 @ 06:28), Max: 37.5 (04-09-23 @ 17:56)  HR: 74 (04-10-23 @ 06:28) (68 - 79)  BP: 93/52 (04-10-23 @ 06:28) (93/52 - 114/68)  RR: 18 (04-10-23 @ 06:28) (17 - 18)  SpO2: 100% (04-10-23 @ 06:28) (100% - 100%)    CAPILLARY BLOOD GLUCOSE    Physical Exam:  General: NAD  Abdomen: Soft, non-tender, gravid  Ext: No pain or swelling  FHR: 125/mod/+accel/-decel  [place]: not jurgen    Labs:  04-10 @ 06:30    133  |  102  |  8   ----------------------------<  82  3.6   |  20  |  0.51    Ca    8.9      04-10 @ 06:30  Phos  3.3     04-10 @ 06:30  Mg     1.70     04-10 @ 06:30    MEDICATIONS  (STANDING):  flecainide 150 milliGRAM(s) Oral two times a day  heparin   Injectable 5000 Unit(s) SubCutaneous every 12 hours  prenatal multivitamin 1 Tablet(s) Oral daily    MEDICATIONS  (PRN):

## 2023-04-10 NOTE — PROGRESS NOTE PEDS - TIME BILLING
Extensive time spent counselling the mother.     I explained to her again that SVT is the most common fetal arrhythmia having the potential for deleterious effects on fetal well-being. SVT is a nonspecific term that encompasses several arrhythmias originating above the AV node, all having different mechanisms of initiation and propagation. The prevalence of SVT in fetuses with tachycardia ranges from 66% to 90%.   In utero SVT has been associated with poor  outcomes in a number of studies. Nonimmune hydrops is associated with SVT in 40% to 50% of cases; furthermore, the response to therapy and mortality are markedly worse in hydropic fetuses. In the fetus, SVT is largely (i.e., >90%) due to an orthodromic reciprocating tachycardia, in which an accessory pathway distinct from the AV node and bundle of His allows for electrical activation of the ventricle separately from typical bora conduction. A defining feature of AVRT is the 1:1 relationship between atrial and ventricular impulses (as in this case) this distinguishes it from atrial flutter and atrial fibrillation. There is very little variability in the ventricular rate.     In the largest series of patients with fetal SVT to date, monotherapy with digoxin was able to reestablish sinus rhythm in 51-59%. Fetuses with hydrops had a significantly lower rate of conversion to sinus rhythm (24.6% vs. 65.4%). Digoxin and Flecainide in combination, after single therapy failure with digoxin, has been found to be very effective with a 92% success rate in a study completed by Agusto et al. in 2000.  In a study by Libby et al in 2016, for short VA SVT, conversion to sinus rhythm was 29 of 42 (69%) for digoxin and 24 of 25 (96%) for Flecainide with the conclusion that Flecainide was more effective than digoxin, especially when hydrops was present.    Most antiarrhythmic drugs are well tolerated in the pregnant patient; however, closer monitoring for the pro arrhythmic effects of certain drugs should be employed owing to the physiological changes in cardiac output and first-pass hepatic metabolism during pregnancy.    The tachycardia is sustained (present for >50% of the time of observation) and we had decided to treat it. The initial fetal echo showed ventricular systolic function is normal, there is no valve regurgitation and no evidence of hydrops. We had started maternal loading IV dose of digoxin.   At this time the SVT is persistent and has not broken. She needs additional second line additional with Flecainide and close monitoring. I discussed the patient with EP specialist .   I discussed the plan first with patient who asked relevant questions.   I also discussed this with MFM, Adult Cardiology and the primary team of residents/nurses taking care of the patient. They agreed with plan.   Patient will remain inpatient while starting Flecainide for close monitoring.
Time spent on face to face counselling with the patient on SVT, natural history, drug side effects, need for follow up and need for  monitoring. Discussed with all consultants involved. reviewed fetal heart rate telemetry and fetal echo images.

## 2023-04-10 NOTE — PROVIDER CONTACT NOTE (OTHER) - RECOMMENDATIONS
MD to come and speak with patient
continue to monitor at this time. OB MD's state they do not need sonogram confirmation of fetal SVT at this time.

## 2023-04-10 NOTE — PROGRESS NOTE ADULT - SUBJECTIVE AND OBJECTIVE BOX
Patient is a 33y old  Female who presents with a chief complaint of Fetal SVT (2023 08:26)  Patient states "I am fine and I want to be able to move around" Patient remains asymptomatic.  Fetal heart monitor demonstrates -130 bpm.  Tolerating Flecainide. QT/QTc 382/448 ms, 1 st degree AVB resolved on today's EKG.     PAST MEDICAL & SURGICAL HISTORY:  Iron deficiency anemia, unspecified iron deficiency anemia type    History of dilatation and curettage    History of lumpectomy        MEDICATIONS  (STANDING):  flecainide 150 milliGRAM(s) Oral two times a day  heparin   Injectable 5000 Unit(s) SubCutaneous every 12 hours  magnesium sulfate  IVPB 1 Gram(s) IV Intermittent once  potassium chloride    Tablet ER 40 milliEquivalent(s) Oral every 4 hours  prenatal multivitamin 1 Tablet(s) Oral daily    MEDICATIONS  (PRN):            Vital Signs Last 24 Hrs  T(C): 37.2 (10 Apr 2023 09:11), Max: 37.5 (2023 17:56)  T(F): 98.9 (10 Apr 2023 09:11), Max: 99.5 (2023 17:56)  HR: 85 (10 Apr 2023 09:11) (68 - 85)  BP: 107/61 (10 Apr 2023 09:11) (93/52 - 109/61)  BP(mean): --  RR: 17 (10 Apr 2023 09:11) (17 - 18)  SpO2: 100% (10 Apr 2023 09:11) (100% - 100%)    Parameters below as of 10 Apr 2023 09:11  Patient On (Oxygen Delivery Method): room air                INTERPRETATION OF TELEMETRY: NSR    EC/10: NSR 83  AR 194ms; QT/QTc 382/448ms, TWI lead 3-4 (seen on baseline)        LABS:    04-10    133<L>  |  102  |  8   ----------------------------<  82  3.6   |  20<L>  |  0.51    Ca    8.9      10 Apr 2023 06:30  Phos  3.3     04-10  Mg     1.70     04-10                BNP  RADIOLOGY & ADDITIONAL STUDIES:      PHYSICAL EXAM:    GENERAL: In no apparent distress, well developed  NECK: Supple and normal thyroid.  No JVD or carotid bruit.  Carotid pulse is 2+ bilaterally.  HEART: Regular rate and rhythm; No murmurs, rubs, or gallops.  PULMONARY: Clear to auscultation and perfusion.  No rales, wheezing, or rhonchi bilaterally.  ABDOMEN: Soft, Nontender, Nondistended; Bowel sounds present  EXTREMITIES:  2+ Peripheral Pulses, No clubbing, cyanosis, or edema  NEUROLOGICAL: Grossly nonfocal

## 2023-04-10 NOTE — PROGRESS NOTE PEDS - ATTENDING COMMENTS
Patient seen and examined at the bedside. I reviewed and edited the entire body of the note above so that it reflects my personal, face-to-face involvement in all specified aspects of the patient's care.  Fetal SVT without hydrops. Broke with Flecainide (failure of Digoxin). Fetal echo today shows sinus rhythm at 142 bpm.   Needs weekly fu with cardiology (alternate with MFM/OB). So heart rate checks twice weekly.  Please arrange EP fu for mother.
agree with above.  will work with adult ep and mfm to treat fetus and mom.  Please obtain dig level 4-6 hrs post 3rd digitalization dose and let us know result asap so we can adjust as needed.  will follow with ped card weekend team over weekend.
28 2/7 weeks gestation with supraventricular tachycardia (possibly AVNRT) without hydrops. Persistent sustained SVT today at the rate of 260 bpm inspite of Digoxin load (3 doses and documented therapeutic level). Will switch to Flecainide treatment today after discussion with EP. Adult cardiology to check maternal EKG and clear prior to starting meds. Discussed with MFM team and patient regarding proarrhythmic side effects and need for close monitoring. Q shift BPP and continuous fetal heart monitoring.

## 2023-04-10 NOTE — PROGRESS NOTE PEDS - ASSESSMENT
Ms. Walters is a 34 yo woman, , currently at 28 4/7 weeks gestation. Her fetus was found to be in supraventricular tachycardia (possibly AVNRT), for which Ms. Walters was started on Digoxin with continuation of SVT for which she was transitioned to Flecainide with resolution of the fetal SVT. The fetal echo at baseline, and repeat today shows no signs of hydrops. The patient ready to be discharged home with therapies and follow-up appointments as outlined below. Detailed discharge instruction was provided to Ms Walters.    DISCHARGE PLAN:   - Patient may be discharged to home, once cleared by OB team.    FOLLOW-UP APPOINTMENTS:   Follow Up:  - Weekly f/u (on ) with Pediatric cardiology (Dr. Hood/ Dr. Richards  - Weekly f/u with primary OB (Thursday/ Friday)  - F/u with Adult EP    Plan discussed with Dr. Sanz Pediatric EP Attending; discussed with MFM team and Adult cardiology   Ms. Walters is a 32 yo woman, , currently at 28 4/7 weeks gestation. Her fetus was found to be in supraventricular tachycardia (possibly AVNRT), for which Ms. Walters was started on Digoxin with continuation of SVT for which she was transitioned to Flecainide with resolution of the fetal SVT. The fetal echo at baseline, and repeat today shows no signs of hydrops. The patient ready to be discharged home with therapies and follow-up appointments as outlined below. Detailed discharge instruction was provided to Ms Walters.    DISCHARGE PLAN:   - Patient may be discharged to home, once cleared by OB team.    FOLLOW-UP APPOINTMENTS:   Follow Up:  - Weekly f/u (on ) with Pediatric cardiology (Dr. Hood/ Dr. Richards)  - Weekly f/u with primary OB (Thursday/ Friday)  - F/u with Adult EP    Plan discussed with Dr. Sanz Pediatric EP Attending; discussed with MFM team and Adult cardiology

## 2023-04-10 NOTE — PROGRESS NOTE PEDS - SUBJECTIVE AND OBJECTIVE BOX
INTERVAL HISTORY: Patient was started on Flecainide over the weekend, with 5 doses received so far. ECHO today and fetal heart tracings overnight with no concerns for SVT.     BACKGROUND INFORMATION: KATHY WOODS is a 33y old  at 28w1d GA with incidental finding of fetal SVT. Patient sent in from OBGYN office for fetal heart rate measuring 260s in the office. Patient denies all complaints at this time. Patient reports that this has been an uncomplicated pregnancy and denies any past medical /cardiac history. She is not on any medication. No family history of any cardiac disease, sudden cardiac death, arrythmia or need for pacemaker. Pediatric cardiology consulted due to concerns for fetal SVT.    Examination deferred     LABORATORY TESTS                          9.2  CBC:   8.12 )-----------( 204   (23 @ 17:45)                          28.6               133   |  102   |  8                  Ca: 8.9    BMP:   ----------------------------< 82     M.70  (04-10-23 @ 06:30)             3.6    |  20    | 0.51               Ph: 3.3      LFT:     TPro: 5.7 / Alb: 3.6 / TBili: <0.2 / DBili: x / AST: 21 / ALT: 15 / AlkPhos: 60   (23 @ 17:45)    COAG: PT: x / PTT: 27.9 / INR: x   (23 @ 11:35)       IMAGING STUDIES:  Fetal heart tracing () NSR from 6p-2am, 215-3am, 615-8am. Now back in SVT since 8am.  Fetal heart tracing (4/9-10) NSR    Echocardiogram - (04.10.23)  Fetal heart rhythm is normal sinus, with echo showing normal biventricular function.

## 2023-04-10 NOTE — PROGRESS NOTE ADULT - ASSESSMENT
33 year old woman 28 weeks  with a pmhx of JUANCARLOS who presented with fetal SVT ( on scan via M mode). Patient admitted to nausea during her pregnancy which she used marijuana to help control her nausea.  EP was consulted for maternal monitor as the fetal is in SVT. s/p Digoxin IV 0.5mg  x3 (ineffective) now switched to Flecainide 150mg bid (also received low dose Metoprolol 25 x1 which was d/cd after EKG showed SR 71 w 1ST degree  AVB.  QT/QTc 382/448 ms this am (baseline Qt/QTc noted on : 384/437).  Fetal HR now in 125-130m bpm      Plan:  - Continuous telemetric monitoring  - Monitor electrolytes and replete K to 4 and Mg to 2 (ordered), discussed dietary intake rich in Mag/K+  - Monitor renal function   - TTE showed EF 65% normal LV function, normal RV size and function, and mild MR.   - Continue  flecainide 150mg PO BID. d/cd  Toprol xl 25mg given 1st degree AVB (1 st degree AVB resolved off BB)  -  continue to monitor on tele, daily 12 leads ECG to check Qt/QTc if remains in hospital   - Continue care per MFM, OBGYN, and peds cardiology  Will d/w EP attending

## 2023-04-10 NOTE — PROVIDER CONTACT NOTE (OTHER) - ASSESSMENT
pt educated on the use of telemetry monitoring for care. pt is not agreeable with plan of care, and is refusing to be placed on continuos telemetry monitoring. MD notified

## 2023-04-10 NOTE — PROVIDER CONTACT NOTE (OTHER) - SITUATION
pt is a 33v year old  @28.1 weeks followed by [facility]
pt a/w with fetal tachycardia at 28.5 G4 0030

## 2023-04-10 NOTE — PROGRESS NOTE ADULT - SUBJECTIVE AND OBJECTIVE BOX
MFM Consult     34yo  at 28w5d presenting with fetal SVT and found to have IUGR 4%ile. Today, patient feeling well. Reports feeling normal fetal movement, denies contractions, vaginal bleeding or leakage of fluid. Patient otherwise in her normal state of health.     ICU Vital Signs Last 24 Hrs  T(C): 36.9 (10 Apr 2023 14:01), Max: 37.5 (2023 17:56)  T(F): 98.5 (10 Apr 2023 14:), Max: 99.5 (2023 17:56)  HR: 81 (10 Apr 2023 14:) (68 - 85)  BP: 112/67 (10 Apr 2023 14:) (93/52 - 112/67)  RR: 17 (10 Apr 2023 14:) (17 - 18)  SpO2: 100% (10 Apr 2023 14:) (100% - 100%)    O2 Parameters below as of 10 Apr 2023 14:01  Patient On (Oxygen Delivery Method): room air    EFM reviewed: Baseline 140, mod yesi +accels -decels, since last night very minimal areas that appear to have SVT  [place]: no contractions     04-10    133<L>  |  102  |  8   ----------------------------<  82  3.6   |  20<L>  |  0.51    Ca    8.9      10 Apr 2023 06:30  Phos  3.3     04-10  Mg     1.70     04-10

## 2023-04-11 ENCOUNTER — NON-APPOINTMENT (OUTPATIENT)
Age: 33
End: 2023-04-11

## 2023-04-12 ENCOUNTER — NON-APPOINTMENT (OUTPATIENT)
Age: 33
End: 2023-04-12

## 2023-04-13 ENCOUNTER — APPOINTMENT (OUTPATIENT)
Dept: ANTEPARTUM | Facility: CLINIC | Age: 33
End: 2023-04-13
Payer: MEDICAID

## 2023-04-13 ENCOUNTER — APPOINTMENT (OUTPATIENT)
Dept: PEDIATRIC CARDIOLOGY | Facility: CLINIC | Age: 33
End: 2023-04-13
Payer: MEDICAID

## 2023-04-13 ENCOUNTER — ASOB RESULT (OUTPATIENT)
Age: 33
End: 2023-04-13

## 2023-04-13 PROCEDURE — 76828 ECHO EXAM OF FETAL HEART: CPT

## 2023-04-13 PROCEDURE — 76819 FETAL BIOPHYS PROFIL W/O NST: CPT

## 2023-04-13 PROCEDURE — 76820 UMBILICAL ARTERY ECHO: CPT

## 2023-04-13 PROCEDURE — 76826 ECHO EXAM OF FETAL HEART: CPT

## 2023-04-13 PROCEDURE — 93325 DOPPLER ECHO COLOR FLOW MAPG: CPT

## 2023-04-13 PROCEDURE — 99214 OFFICE O/P EST MOD 30 MIN: CPT | Mod: 25

## 2023-04-14 ENCOUNTER — NON-APPOINTMENT (OUTPATIENT)
Age: 33
End: 2023-04-14

## 2023-04-17 ENCOUNTER — APPOINTMENT (OUTPATIENT)
Dept: PEDIATRIC CARDIOLOGY | Facility: CLINIC | Age: 33
End: 2023-04-17
Payer: MEDICAID

## 2023-04-17 ENCOUNTER — ASOB RESULT (OUTPATIENT)
Age: 33
End: 2023-04-17

## 2023-04-17 ENCOUNTER — APPOINTMENT (OUTPATIENT)
Dept: ANTEPARTUM | Facility: CLINIC | Age: 33
End: 2023-04-17
Payer: MEDICAID

## 2023-04-17 PROCEDURE — 99213 OFFICE O/P EST LOW 20 MIN: CPT | Mod: 25

## 2023-04-17 PROCEDURE — 76828 ECHO EXAM OF FETAL HEART: CPT

## 2023-04-17 PROCEDURE — 76820 UMBILICAL ARTERY ECHO: CPT

## 2023-04-17 PROCEDURE — 76826 ECHO EXAM OF FETAL HEART: CPT

## 2023-04-17 PROCEDURE — 99214 OFFICE O/P EST MOD 30 MIN: CPT | Mod: 25

## 2023-04-17 PROCEDURE — 93325 DOPPLER ECHO COLOR FLOW MAPG: CPT

## 2023-04-17 PROCEDURE — 76819 FETAL BIOPHYS PROFIL W/O NST: CPT

## 2023-04-19 ENCOUNTER — APPOINTMENT (OUTPATIENT)
Dept: OTHER | Facility: CLINIC | Age: 33
End: 2023-04-19
Payer: MEDICAID

## 2023-04-19 DIAGNOSIS — I47.1 SUPRAVENTRICULAR TACHYCARDIA: ICD-10-CM

## 2023-04-19 PROCEDURE — 99204 OFFICE O/P NEW MOD 45 MIN: CPT | Mod: 95

## 2023-04-19 NOTE — ASSESSMENT
[FreeTextEntry1] : Referral Subject: Fetal SVT\par \par Pregnancy Hx: Mother is a 33 year old P0 mother currently 30 weeks gestation (EDC 23). Prenatal history significant for supraventricular tachycardia diagnosed at 28 weeks. She is s/p inpatient management and treatment with Digoxin (4/7-10). Fetal heart rate now in sinus rhythm with Flecainide treatment. ECHO normal. No hydrops. IUGR, 4%ile. \par \par Medical/Surgical Hx:  None reported\par Family Hx:  N/A\par Prior OB:  N/A\par \par Counseling and consultation description:  \par This consult was conducted via Telehealth using real-time 2 way audio visual technology. The patient, Kiana Walters, was located at home at the time of the visit. The provider, Dr. Aletha Ewing, was located at her home office at the time of the visit. The patient and the provider participated in the Telehealth encounter. Verbal consent for Telehealth services was given prior to the visit by the patient, Kiana Walters. \par \par  The goal of the meeting was to discuss the fetal diagnosis of supraventricular tachycardia. Mother has previously been consulted by MFM and Cardiology.  \par \par Fetal supraventricular tachycardia (SVT) is the most common form of fetal tachycardia. It accounts for 60-80% of fetal tachyarrhythmias with prevalence ranging from 1000 to 1/25,000 pregnancies. The most common SVT in neonates in reentry tachycardia. The clinical presentation is widely variable, ranging from asymptomatic to severe, with high output cardiac failure leading to hydrops fetalis. Severe cases may be life threatening, with hypotension, cardiac collapse and shock. The earlier in gestation the SVT starts, the higher the risk of hydrops fetalis. Treatment decision is based on maternal risk and fetal benefits. \par \par We discussed the goals of frequent fetal monitoring and evaluation of hydrops. I described the  presentation and outcomes of hydrops fetalis. \par \par We discussed that a team of physicians will be present at the time of birth. The baby’s vital signs will be continuously monitored.  Any respiratory and/or cardiovascular support will be provided as needed.  The infant will be carefully examined at birth, and additional studies performed as indicated. The baby will be admitted to NICU for cardiac monitoring, observation, and  management.   workup may include EKG and/or echocardiogram. Cardiology team will continue to follow. We discussed potential  treatment options, including vagal maneuvers, antiarrhythmics (first line: Adenosine), or cardioversion. For persistent arrhythmias, prophylactic treatment or ablative therapies may be considered. \par \par We discussed the importance of all nutrition with an emphasis on breast milk as an important nutritional and health promoting food source. She should pump immediately after delivery, and provide colostrum and milk as possible. Lactation consultants will advise on breastfeeding techniques.  \par \par We discussed the extensive capabilities of our hospital in terms of personnel and equipment.  We also discussed visiting hours/policies. Ms. Walters verbalized an understanding of the topics and all questions were answered.  \par \par Assessment: \par 1.	Currently 30 weeks gestation\par 2.	 Fetal supraventricular tachycardia (SVT)\par \par Plan:  \par 1.	Plan to deliver at [facility]; [place] [facility] \par 2.	 monitoring and Cardiology evaluation \par 3.	Further management as clinically indicated\par 4.	 Complex Care Team (PCCT) will continue to follow \par \par Please contact me if you or Ms. Walters have additional questions regarding this report or discussion. \par \par Thank you for the opportunity to participate in this patient’s care.  \par

## 2023-04-20 ENCOUNTER — ASOB RESULT (OUTPATIENT)
Age: 33
End: 2023-04-20

## 2023-04-20 ENCOUNTER — APPOINTMENT (OUTPATIENT)
Dept: ANTEPARTUM | Facility: CLINIC | Age: 33
End: 2023-04-20
Payer: MEDICAID

## 2023-04-20 PROCEDURE — 76819 FETAL BIOPHYS PROFIL W/O NST: CPT

## 2023-04-20 PROCEDURE — 76820 UMBILICAL ARTERY ECHO: CPT

## 2023-04-24 ENCOUNTER — ASOB RESULT (OUTPATIENT)
Age: 33
End: 2023-04-24

## 2023-04-24 ENCOUNTER — APPOINTMENT (OUTPATIENT)
Dept: PEDIATRIC CARDIOLOGY | Facility: CLINIC | Age: 33
End: 2023-04-24
Payer: MEDICAID

## 2023-04-24 ENCOUNTER — APPOINTMENT (OUTPATIENT)
Dept: ANTEPARTUM | Facility: CLINIC | Age: 33
End: 2023-04-24
Payer: MEDICAID

## 2023-04-24 PROCEDURE — 76821 MIDDLE CEREBRAL ARTERY ECHO: CPT | Mod: 59

## 2023-04-24 PROCEDURE — 76816 OB US FOLLOW-UP PER FETUS: CPT

## 2023-04-24 PROCEDURE — 93325 DOPPLER ECHO COLOR FLOW MAPG: CPT

## 2023-04-24 PROCEDURE — 76826 ECHO EXAM OF FETAL HEART: CPT

## 2023-04-24 PROCEDURE — 99213 OFFICE O/P EST LOW 20 MIN: CPT | Mod: 25

## 2023-04-24 PROCEDURE — 76828 ECHO EXAM OF FETAL HEART: CPT

## 2023-04-24 PROCEDURE — 76819 FETAL BIOPHYS PROFIL W/O NST: CPT | Mod: 59

## 2023-04-26 ENCOUNTER — NON-APPOINTMENT (OUTPATIENT)
Age: 33
End: 2023-04-26

## 2023-04-26 ENCOUNTER — APPOINTMENT (OUTPATIENT)
Dept: ELECTROPHYSIOLOGY | Facility: CLINIC | Age: 33
End: 2023-04-26
Payer: MEDICAID

## 2023-04-26 VITALS
SYSTOLIC BLOOD PRESSURE: 110 MMHG | RESPIRATION RATE: 16 BRPM | HEIGHT: 64 IN | HEART RATE: 70 BPM | BODY MASS INDEX: 23.77 KG/M2 | TEMPERATURE: 98.3 F | WEIGHT: 139.25 LBS | OXYGEN SATURATION: 100 % | DIASTOLIC BLOOD PRESSURE: 71 MMHG

## 2023-04-26 DIAGNOSIS — O36.8390 MATERNAL CARE FOR ABNORMALITIES OF THE FETAL HEART RATE OR RHYTHM, UNSPECIFIED TRIMESTER, NOT APPLICABLE OR UNSPECIFIED: ICD-10-CM

## 2023-04-26 PROCEDURE — 99213 OFFICE O/P EST LOW 20 MIN: CPT | Mod: 25

## 2023-04-26 PROCEDURE — 93000 ELECTROCARDIOGRAM COMPLETE: CPT

## 2023-04-26 NOTE — HISTORY OF PRESENT ILLNESS
[FreeTextEntry1] : Referring Physician: Cassius Sanz MD\par \par Dear Dr. Sanz:\par  \par Ms. Walters was seen in the [facility] today. For our records, please allow me to summarize the history and my findings.\par  \par This pleasant 33 year old woman has no significant cardiovascular history. She is currently 31 weeks . Her past medical history is significant for JUANCARLOS. She was admitted from 23 to 4/10/23 to [facility] as her fetus was in SVT. She was initially started on Digoxin, but the rates could not be controlled. It was stopped and she was subsequently started on Flecainide 15mg BID and Metorplol 25mg daily. The metorpolol was stopped due to prolongation in her ME. The baby broke out of SVT with Flecainide. She reports the baby has been in normal rhythm since on her follow up. She has had no issues with Flecainide. Her EKG today reveals no widening of the QRS. \par  \par Ms. Walters denies any recent history of chest pain, shortness of breath, palpitations, dizziness, or syncope.\par

## 2023-04-26 NOTE — DISCUSSION/SUMMARY
[FreeTextEntry1] : In summary, this is a 33 year old woman with no significant cardiovascular history. She is currently 31 weeks . Her past medical history is significant for JUANCARLOS. She was admitted from 23 to 4/10/23 to [facility] as her fetus was in SVT. The baby converted with Flecainide 150mg BID. She has been tolerating the medication well. Her RAUL today was reviewed and shows sinus rhythm with first degree AV block. She is off metoprolol due to the first degree. She will RTC in 2 weeks for a repeat EKG as she is on Flecainide. \par  \par Ms. Walters appeared to understand the whole discussion and verbalized that all of her questions were answered to her satisfaction.\par  \par Thank you for allowing me to be involved in the care of this pleasant woman. Please feel free to contact me with any questions. [EKG obtained to assist in diagnosis and management of assessed problem(s)] : EKG obtained to assist in diagnosis and management of assessed problem(s)

## 2023-04-26 NOTE — CARDIOLOGY SUMMARY
[de-identified] : 4/26/23: Sinus rhythm at 66 bpm, first degree AV block [de-identified] : 4/6/23 CONCLUSIONS: [id number]%\par 1. Normal mitral valve. Mild mitral regurgitation. \par 2. Normal left ventricular internal dimensions and wall\par thicknesses.\par 3. Normal left ventricular systolic function. No segmental\par wall motion abnormalities.\par 4. Normal left ventricular diastolic function.\par 5. Normal right ventricular size and function.

## 2023-04-27 ENCOUNTER — APPOINTMENT (OUTPATIENT)
Dept: ANTEPARTUM | Facility: CLINIC | Age: 33
End: 2023-04-27
Payer: MEDICAID

## 2023-04-27 ENCOUNTER — ASOB RESULT (OUTPATIENT)
Age: 33
End: 2023-04-27

## 2023-04-27 PROCEDURE — 76820 UMBILICAL ARTERY ECHO: CPT

## 2023-04-27 PROCEDURE — 76819 FETAL BIOPHYS PROFIL W/O NST: CPT

## 2023-05-01 ENCOUNTER — ASOB RESULT (OUTPATIENT)
Age: 33
End: 2023-05-01

## 2023-05-01 ENCOUNTER — APPOINTMENT (OUTPATIENT)
Dept: ANTEPARTUM | Facility: CLINIC | Age: 33
End: 2023-05-01
Payer: MEDICAID

## 2023-05-01 ENCOUNTER — APPOINTMENT (OUTPATIENT)
Dept: PEDIATRIC CARDIOLOGY | Facility: CLINIC | Age: 33
End: 2023-05-01

## 2023-05-01 PROCEDURE — 76820 UMBILICAL ARTERY ECHO: CPT

## 2023-05-01 PROCEDURE — 76821 MIDDLE CEREBRAL ARTERY ECHO: CPT

## 2023-05-01 PROCEDURE — 76819 FETAL BIOPHYS PROFIL W/O NST: CPT

## 2023-05-04 ENCOUNTER — APPOINTMENT (OUTPATIENT)
Dept: ANTEPARTUM | Facility: CLINIC | Age: 33
End: 2023-05-04
Payer: MEDICAID

## 2023-05-04 ENCOUNTER — APPOINTMENT (OUTPATIENT)
Dept: ANTEPARTUM | Facility: CLINIC | Age: 33
End: 2023-05-04

## 2023-05-04 ENCOUNTER — ASOB RESULT (OUTPATIENT)
Age: 33
End: 2023-05-04

## 2023-05-04 PROCEDURE — 76818 FETAL BIOPHYS PROFILE W/NST: CPT

## 2023-05-04 PROCEDURE — 76820 UMBILICAL ARTERY ECHO: CPT

## 2023-05-08 ENCOUNTER — APPOINTMENT (OUTPATIENT)
Dept: PEDIATRIC CARDIOLOGY | Facility: CLINIC | Age: 33
End: 2023-05-08

## 2023-05-09 ENCOUNTER — ASOB RESULT (OUTPATIENT)
Age: 33
End: 2023-05-09

## 2023-05-09 ENCOUNTER — APPOINTMENT (OUTPATIENT)
Dept: ANTEPARTUM | Facility: CLINIC | Age: 33
End: 2023-05-09
Payer: MEDICAID

## 2023-05-09 PROCEDURE — 76820 UMBILICAL ARTERY ECHO: CPT | Mod: 59

## 2023-05-09 PROCEDURE — 76818 FETAL BIOPHYS PROFILE W/NST: CPT | Mod: 59

## 2023-05-09 PROCEDURE — 76816 OB US FOLLOW-UP PER FETUS: CPT

## 2023-05-10 ENCOUNTER — APPOINTMENT (OUTPATIENT)
Dept: ELECTROPHYSIOLOGY | Facility: CLINIC | Age: 33
End: 2023-05-10

## 2023-05-11 ENCOUNTER — APPOINTMENT (OUTPATIENT)
Dept: PEDIATRIC CARDIOLOGY | Facility: CLINIC | Age: 33
End: 2023-05-11
Payer: MEDICAID

## 2023-05-11 ENCOUNTER — APPOINTMENT (OUTPATIENT)
Dept: ANTEPARTUM | Facility: CLINIC | Age: 33
End: 2023-05-11
Payer: MEDICAID

## 2023-05-11 ENCOUNTER — ASOB RESULT (OUTPATIENT)
Age: 33
End: 2023-05-11

## 2023-05-11 PROCEDURE — 76820 UMBILICAL ARTERY ECHO: CPT

## 2023-05-11 PROCEDURE — 76818 FETAL BIOPHYS PROFILE W/NST: CPT

## 2023-05-11 PROCEDURE — 99214 OFFICE O/P EST MOD 30 MIN: CPT | Mod: 25

## 2023-05-11 PROCEDURE — 76828 ECHO EXAM OF FETAL HEART: CPT

## 2023-05-11 PROCEDURE — 93325 DOPPLER ECHO COLOR FLOW MAPG: CPT

## 2023-05-11 PROCEDURE — 76826 ECHO EXAM OF FETAL HEART: CPT

## 2023-05-15 ENCOUNTER — ASOB RESULT (OUTPATIENT)
Age: 33
End: 2023-05-15

## 2023-05-15 ENCOUNTER — APPOINTMENT (OUTPATIENT)
Dept: PEDIATRIC CARDIOLOGY | Facility: CLINIC | Age: 33
End: 2023-05-15

## 2023-05-15 ENCOUNTER — APPOINTMENT (OUTPATIENT)
Dept: ANTEPARTUM | Facility: CLINIC | Age: 33
End: 2023-05-15
Payer: MEDICAID

## 2023-05-15 PROCEDURE — 76818 FETAL BIOPHYS PROFILE W/NST: CPT

## 2023-05-15 PROCEDURE — 76820 UMBILICAL ARTERY ECHO: CPT

## 2023-05-18 ENCOUNTER — APPOINTMENT (OUTPATIENT)
Dept: ANTEPARTUM | Facility: CLINIC | Age: 33
End: 2023-05-18
Payer: MEDICAID

## 2023-05-18 ENCOUNTER — ASOB RESULT (OUTPATIENT)
Age: 33
End: 2023-05-18

## 2023-05-18 PROCEDURE — 76820 UMBILICAL ARTERY ECHO: CPT

## 2023-05-18 PROCEDURE — 76818 FETAL BIOPHYS PROFILE W/NST: CPT

## 2023-05-22 ENCOUNTER — APPOINTMENT (OUTPATIENT)
Dept: PEDIATRIC CARDIOLOGY | Facility: CLINIC | Age: 33
End: 2023-05-22
Payer: MEDICAID

## 2023-05-22 ENCOUNTER — ASOB RESULT (OUTPATIENT)
Age: 33
End: 2023-05-22

## 2023-05-22 ENCOUNTER — APPOINTMENT (OUTPATIENT)
Dept: ANTEPARTUM | Facility: CLINIC | Age: 33
End: 2023-05-22
Payer: MEDICAID

## 2023-05-22 PROCEDURE — 76820 UMBILICAL ARTERY ECHO: CPT

## 2023-05-22 PROCEDURE — 76828 ECHO EXAM OF FETAL HEART: CPT

## 2023-05-22 PROCEDURE — 76818 FETAL BIOPHYS PROFILE W/NST: CPT

## 2023-05-22 PROCEDURE — 76826 ECHO EXAM OF FETAL HEART: CPT

## 2023-05-22 PROCEDURE — 99215 OFFICE O/P EST HI 40 MIN: CPT | Mod: 25

## 2023-05-22 PROCEDURE — 76821 MIDDLE CEREBRAL ARTERY ECHO: CPT

## 2023-05-22 PROCEDURE — 93325 DOPPLER ECHO COLOR FLOW MAPG: CPT | Mod: 59

## 2023-05-24 ENCOUNTER — APPOINTMENT (OUTPATIENT)
Dept: PEDIATRIC CARDIOLOGY | Facility: CLINIC | Age: 33
End: 2023-05-24
Payer: MEDICAID

## 2023-05-24 PROCEDURE — 99214 OFFICE O/P EST MOD 30 MIN: CPT | Mod: 25

## 2023-05-24 PROCEDURE — 76826 ECHO EXAM OF FETAL HEART: CPT

## 2023-05-24 PROCEDURE — 76828 ECHO EXAM OF FETAL HEART: CPT

## 2023-05-24 PROCEDURE — 93325 DOPPLER ECHO COLOR FLOW MAPG: CPT

## 2023-05-25 ENCOUNTER — APPOINTMENT (OUTPATIENT)
Dept: ANTEPARTUM | Facility: CLINIC | Age: 33
End: 2023-05-25

## 2023-05-31 ENCOUNTER — NON-APPOINTMENT (OUTPATIENT)
Age: 33
End: 2023-05-31

## 2023-05-31 ENCOUNTER — APPOINTMENT (OUTPATIENT)
Dept: PEDIATRIC CARDIOLOGY | Facility: CLINIC | Age: 33
End: 2023-05-31
Payer: MEDICAID

## 2023-05-31 PROCEDURE — 93325 DOPPLER ECHO COLOR FLOW MAPG: CPT | Mod: 26

## 2023-05-31 PROCEDURE — 76821 MIDDLE CEREBRAL ARTERY ECHO: CPT

## 2023-05-31 PROCEDURE — 76825 ECHO EXAM OF FETAL HEART: CPT

## 2023-05-31 PROCEDURE — 76827 ECHO EXAM OF FETAL HEART: CPT

## 2023-05-31 PROCEDURE — 99203 OFFICE O/P NEW LOW 30 MIN: CPT

## 2023-05-31 PROCEDURE — 76820 UMBILICAL ARTERY ECHO: CPT

## 2023-06-02 ENCOUNTER — NON-APPOINTMENT (OUTPATIENT)
Age: 33
End: 2023-06-02

## 2023-06-05 ENCOUNTER — NON-APPOINTMENT (OUTPATIENT)
Age: 33
End: 2023-06-05

## 2023-06-06 ENCOUNTER — ASOB RESULT (OUTPATIENT)
Age: 33
End: 2023-06-06

## 2023-06-06 ENCOUNTER — APPOINTMENT (OUTPATIENT)
Dept: ANTEPARTUM | Facility: CLINIC | Age: 33
End: 2023-06-06
Payer: MEDICAID

## 2023-06-06 PROCEDURE — 76816 OB US FOLLOW-UP PER FETUS: CPT

## 2023-06-06 PROCEDURE — 76818 FETAL BIOPHYS PROFILE W/NST: CPT

## 2023-06-06 PROCEDURE — 76820 UMBILICAL ARTERY ECHO: CPT | Mod: 59

## 2023-06-08 ENCOUNTER — APPOINTMENT (OUTPATIENT)
Dept: PEDIATRIC CARDIOLOGY | Facility: CLINIC | Age: 33
End: 2023-06-08
Payer: MEDICAID

## 2023-06-08 PROCEDURE — 99214 OFFICE O/P EST MOD 30 MIN: CPT | Mod: 25

## 2023-06-08 PROCEDURE — 93325 DOPPLER ECHO COLOR FLOW MAPG: CPT

## 2023-06-08 PROCEDURE — 76825 ECHO EXAM OF FETAL HEART: CPT

## 2023-06-08 PROCEDURE — 76827 ECHO EXAM OF FETAL HEART: CPT

## 2023-06-12 ENCOUNTER — APPOINTMENT (OUTPATIENT)
Dept: PEDIATRIC CARDIOLOGY | Facility: CLINIC | Age: 33
End: 2023-06-12

## 2023-06-13 ENCOUNTER — APPOINTMENT (OUTPATIENT)
Dept: PEDIATRIC CARDIOLOGY | Facility: CLINIC | Age: 33
End: 2023-06-13

## 2023-06-15 ENCOUNTER — APPOINTMENT (OUTPATIENT)
Dept: ANTEPARTUM | Facility: CLINIC | Age: 33
End: 2023-06-15

## 2023-06-16 ENCOUNTER — APPOINTMENT (OUTPATIENT)
Dept: ANTEPARTUM | Facility: CLINIC | Age: 33
End: 2023-06-16

## 2023-06-18 ENCOUNTER — INPATIENT (INPATIENT)
Facility: HOSPITAL | Age: 33
LOS: 3 days | Discharge: HOME | End: 2023-06-22
Attending: OBSTETRICS & GYNECOLOGY | Admitting: OBSTETRICS & GYNECOLOGY
Payer: MEDICAID

## 2023-06-18 VITALS — HEART RATE: 85 BPM | DIASTOLIC BLOOD PRESSURE: 64 MMHG | SYSTOLIC BLOOD PRESSURE: 111 MMHG

## 2023-06-18 DIAGNOSIS — Z98.890 OTHER SPECIFIED POSTPROCEDURAL STATES: Chronic | ICD-10-CM

## 2023-06-18 DIAGNOSIS — O36.8330 MATERNAL CARE FOR ABNORMALITIES OF THE FETAL HEART RATE OR RHYTHM, THIRD TRIMESTER, NOT APPLICABLE OR UNSPECIFIED: ICD-10-CM

## 2023-06-18 PROCEDURE — 86077 PHYS BLOOD BANK SERV XMATCH: CPT

## 2023-06-18 RX ORDER — CHLORHEXIDINE GLUCONATE 213 G/1000ML
1 SOLUTION TOPICAL DAILY
Refills: 0 | Status: DISCONTINUED | OUTPATIENT
Start: 2023-06-18 | End: 2023-06-19

## 2023-06-18 RX ORDER — CITRIC ACID/SODIUM CITRATE 300-500 MG
15 SOLUTION, ORAL ORAL EVERY 6 HOURS
Refills: 0 | Status: DISCONTINUED | OUTPATIENT
Start: 2023-06-18 | End: 2023-06-19

## 2023-06-18 RX ORDER — SODIUM CHLORIDE 9 MG/ML
1000 INJECTION, SOLUTION INTRAVENOUS
Refills: 0 | Status: DISCONTINUED | OUTPATIENT
Start: 2023-06-18 | End: 2023-06-19

## 2023-06-18 RX ORDER — OXYTOCIN 10 UNIT/ML
333.33 VIAL (ML) INJECTION
Qty: 20 | Refills: 0 | Status: DISCONTINUED | OUTPATIENT
Start: 2023-06-18 | End: 2023-06-19

## 2023-06-18 NOTE — OB RN PATIENT PROFILE - FUNCTIONAL ASSESSMENT - BASIC MOBILITY 6.
Esophagogastroduodenoscopy    Indications:  Occult blood in the stool without colon source. Medications:  See anesthesia form    Post procedure diagnosis:  duodenitis  gastropathy of stomach      Description of Procedure:    Prior to the procedure its objectives, risks, consequences and alternatives were discussed with the patient who then elected to proceed. The Olympus video endoscope was inserted under direct vision into the mouth and then into the esophagus. The esophagus looked normal.    The z-line was located at 39 cm. There were streaks of erythema in the body and antrum. There was a diffuse mosaic gastropathy in the distal body and antrum with some erosions. There were no other diagnostic abnormalities of the body, fundus, antrum, cardia and incisura of the stomach. This included direct and retroflexion examination. The first portion of the duodenum showed duodenitis manifested by erythema and sub-epithelial hemorrhage. The second portion of the duodenum appeared normal.    I took duodenal and gastric biopsies. Complications: There were no apparent complications and the patient tolerated the procedure well.         Estimated Blood Loss:  none  Specimens Removed:  Duodenum  stomach  Impressions:  Duodenitis--hemorrhagic--suspect cause of occult blood in stool  Gastropathy with erosions  (acid peptic disease versus helicobacter pylori related--biopsies pending)      Signed By: Nicole Cochran MD                        March 7, 2017     2:30 PM
4 = No assist / stand by assistance

## 2023-06-18 NOTE — OB RN PATIENT PROFILE - FALL HARM RISK - RISK INTERVENTIONS

## 2023-06-18 NOTE — OB RN PATIENT PROFILE - ALERT: PERTINENT HISTORY
Echo for fetal/Fetal Sonogram/1st Trimester Sonogram/20 Week Level II Sonogram/BioPhysical Profile(s)/Follow up Sonogram for Growth/Fetal Non-Stress Test (NST)/Ultra Screen at 12 Weeks

## 2023-06-18 NOTE — OB RN PATIENT PROFILE - NSMATERNALFETALCONCERNS_OBGYN_ALL_OB_FT
Maternal/Fetal Alert  FETAL SVT   s/p inpatient management thru 4/10/2023  s/p Digitalization with 0.5mg iv q 8 h x 3 Digoxin   Continue flecainide 150mg PO BID and Toprol xl 25mg daily   ALERT NICU   Tia Thompson RN 4/10/2023    5.11.23: Addendum: Fetal echo done today.Fetal SVT at the rate of 260 bpm without hydrops, first diagnosed at 28 1/7 weeks gestation. S/p inpatient monitoring and treatment (4/7-4/10/2023). Failure to break after 3 doses of IV Digoxin (with therapeutic level). SVT resolved after switching to Flecainide. Now on Flecainide 150 mg BID. Fetal heart rhythm: normal with rate of 134-155 bpm. There is no evidence of atrial or ventricular ectopy.No signs of fetal hydrops, with no significant pericardial effusion and obvious pleural effusion/ascites.Follow up fetal echocardiogram and consultation recommended q weekly (alternate with OB/MFM). She can be seen by pediatric cardiology every other week.Follow up with adult cardiology as recommended. After birth, infant will be admitted to [facility].Hien Sidhu RN    Maternal/Fetal Alert

## 2023-06-19 LAB
BASOPHILS # BLD AUTO: 0.01 K/UL — SIGNIFICANT CHANGE UP (ref 0–0.2)
BASOPHILS NFR BLD AUTO: 0.1 % — SIGNIFICANT CHANGE UP (ref 0–2)
BLD GP AB SCN SERPL QL: POSITIVE — SIGNIFICANT CHANGE UP
BLD GP AB SCN SERPL QL: POSITIVE — SIGNIFICANT CHANGE UP
COVID-19 SPIKE DOMAIN AB INTERP: POSITIVE
COVID-19 SPIKE DOMAIN ANTIBODY RESULT: >250 U/ML — HIGH
EOSINOPHIL # BLD AUTO: 0.08 K/UL — SIGNIFICANT CHANGE UP (ref 0–0.5)
EOSINOPHIL NFR BLD AUTO: 1.1 % — SIGNIFICANT CHANGE UP (ref 0–6)
HCT VFR BLD CALC: 33.1 % — LOW (ref 34.5–45)
HGB BLD-MCNC: 11.1 G/DL — LOW (ref 11.5–15.5)
IANC: 4.42 K/UL — SIGNIFICANT CHANGE UP (ref 1.8–7.4)
IMM GRANULOCYTES NFR BLD AUTO: 0.3 % — SIGNIFICANT CHANGE UP (ref 0–0.9)
LYMPHOCYTES # BLD AUTO: 1.96 K/UL — SIGNIFICANT CHANGE UP (ref 1–3.3)
LYMPHOCYTES # BLD AUTO: 27.5 % — SIGNIFICANT CHANGE UP (ref 13–44)
MCHC RBC-ENTMCNC: 30.2 PG — SIGNIFICANT CHANGE UP (ref 27–34)
MCHC RBC-ENTMCNC: 33.5 GM/DL — SIGNIFICANT CHANGE UP (ref 32–36)
MCV RBC AUTO: 90.2 FL — SIGNIFICANT CHANGE UP (ref 80–100)
MONOCYTES # BLD AUTO: 0.65 K/UL — SIGNIFICANT CHANGE UP (ref 0–0.9)
MONOCYTES NFR BLD AUTO: 9.1 % — SIGNIFICANT CHANGE UP (ref 2–14)
NEUTROPHILS # BLD AUTO: 4.42 K/UL — SIGNIFICANT CHANGE UP (ref 1.8–7.4)
NEUTROPHILS NFR BLD AUTO: 61.9 % — SIGNIFICANT CHANGE UP (ref 43–77)
NRBC # BLD: 0 /100 WBCS — SIGNIFICANT CHANGE UP (ref 0–0)
NRBC # FLD: 0 K/UL — SIGNIFICANT CHANGE UP (ref 0–0)
PLATELET # BLD AUTO: 204 K/UL — SIGNIFICANT CHANGE UP (ref 150–400)
RBC # BLD: 3.67 M/UL — LOW (ref 3.8–5.2)
RBC # FLD: 13 % — SIGNIFICANT CHANGE UP (ref 10.3–14.5)
RH IG SCN BLD-IMP: POSITIVE — SIGNIFICANT CHANGE UP
RH IG SCN BLD-IMP: POSITIVE — SIGNIFICANT CHANGE UP
SARS-COV-2 IGG+IGM SERPL QL IA: >250 U/ML — HIGH
SARS-COV-2 IGG+IGM SERPL QL IA: POSITIVE
WBC # BLD: 7.14 K/UL — SIGNIFICANT CHANGE UP (ref 3.8–10.5)
WBC # FLD AUTO: 7.14 K/UL — SIGNIFICANT CHANGE UP (ref 3.8–10.5)

## 2023-06-19 RX ORDER — ONDANSETRON 8 MG/1
4 TABLET, FILM COATED ORAL ONCE
Refills: 0 | Status: COMPLETED | OUTPATIENT
Start: 2023-06-19 | End: 2023-06-19

## 2023-06-19 RX ORDER — OXYTOCIN 10 UNIT/ML
333.33 VIAL (ML) INJECTION
Qty: 20 | Refills: 0 | Status: COMPLETED | OUTPATIENT
Start: 2023-06-19 | End: 2023-06-19

## 2023-06-19 RX ORDER — SODIUM CHLORIDE 9 MG/ML
1000 INJECTION, SOLUTION INTRAVENOUS
Refills: 0 | Status: DISCONTINUED | OUTPATIENT
Start: 2023-06-19 | End: 2023-06-20

## 2023-06-19 RX ORDER — FLECAINIDE ACETATE 50 MG
150 TABLET ORAL
Refills: 0 | Status: DISCONTINUED | OUTPATIENT
Start: 2023-06-19 | End: 2023-06-21

## 2023-06-19 RX ORDER — CHLORHEXIDINE GLUCONATE 213 G/1000ML
1 SOLUTION TOPICAL DAILY
Refills: 0 | Status: DISCONTINUED | OUTPATIENT
Start: 2023-06-19 | End: 2023-06-20

## 2023-06-19 RX ORDER — CITRIC ACID/SODIUM CITRATE 300-500 MG
15 SOLUTION, ORAL ORAL EVERY 6 HOURS
Refills: 0 | Status: DISCONTINUED | OUTPATIENT
Start: 2023-06-19 | End: 2023-06-20

## 2023-06-19 RX ADMIN — ONDANSETRON 4 MILLIGRAM(S): 8 TABLET, FILM COATED ORAL at 04:08

## 2023-06-19 RX ADMIN — Medication 150 MILLIGRAM(S): at 22:46

## 2023-06-19 RX ADMIN — Medication 150 MILLIGRAM(S): at 10:54

## 2023-06-19 NOTE — OB PROVIDER H&P - ASSESSMENT
34yo  at 38w5d presents for IOL for fetal IUGR (9%, dopplers wnl) as well as fetal SVT.      - Admit to L&D  - Routine labs/IVH  - EFM/toco, [facility] made aware of fetal SVT  - GBS neg, no abx indicated at this time  - Plan: buccal cytotec, cervical balloon    UPDATE: toco notable for q2-3m contractions, unable to administration buccal or PO cytotec; will place balloon    Amyeo Afroz Jereen, PGY-2  d/w Dr Marsh

## 2023-06-19 NOTE — OB PROVIDER H&P - ATTENDING COMMENTS
Agree with above  P0 IOL for IUGR 9% dopplers WNL, for BC/CB  Fetal alert for SVT, on flecainide with good control  NICU aware    brendon BOTELLO

## 2023-06-19 NOTE — OB PROVIDER H&P - NSMATERNALFETALCONCERNS_OBGYN_ALL_OB_FT
Maternal/Fetal Alert  FETAL SVT   s/p inpatient management thru 4/10/2023  s/p Digitalization with 0.5mg iv q 8 h x 3 Digoxin   Continue flecainide 150mg PO BID and Toprol xl 25mg daily   ALERT NICU   Tia Thompson RN 4/10/2023    5.11.23: Addendum: Fetal echo done today.Fetal SVT at the rate of 260 bpm without hydrops, first diagnosed at 28 1/7 weeks gestation. S/p inpatient monitoring and treatment (4/7-4/10/2023). Failure to break after 3 doses of IV Digoxin (with therapeutic level). SVT resolved after switching to Flecainide. Now on Flecainide 150 mg BID. Fetal heart rhythm: normal with rate of 134-155 bpm. There is no evidence of atrial or ventricular ectopy.No signs of fetal hydrops, with no significant pericardial effusion and obvious pleural effusion/ascites.Follow up fetal echocardiogram and consultation recommended q weekly (alternate with OB/MFM). She can be seen by pediatric cardiology every other week.Follow up with adult cardiology as recommended. After birth, infant will be admitted to [facility] NICU.Hien Sidhu RN    Maternal/Fetal Alert

## 2023-06-19 NOTE — OB PROVIDER H&P - HISTORY OF PRESENT ILLNESS
32yo  at 38w5d presents for IOL for fetal IUGR (9%, dopplers wnl) as well as fetal SVT - diagnosed at 28w1d when fetal tachycardia was notable to 250 bpm on scan via M mode in the office. She was admitted for medical management at that time, was placed on digoxin for less than x24hrs and then switched over to flecainide. Currently on flecainide 150mg BID, last dose last night at 1030p. Patient denies chest pain, shortness of breath, fevers, chills, nausea, vomiting, diarrhea.  GBS neg, EFW 2877     (2477  gm      5 lb 7 oz       9  %  23)    OBHx: TOPx3 - D&C x2, mTOPx1 (, , )  GYNHx: denies fibroids, cysts, abnormal paps, STDs  PMH: denies  PSH: D&Cx2, left breast lumpectomy () benign  Meds: PNVs  All: NKDA  Soc: no substance use, anxiety/depression    accepts blood

## 2023-06-20 LAB
ALBUMIN SERPL ELPH-MCNC: 3.5 G/DL — SIGNIFICANT CHANGE UP (ref 3.3–5)
ALP SERPL-CCNC: 165 U/L — HIGH (ref 40–120)
ALT FLD-CCNC: 17 U/L — SIGNIFICANT CHANGE UP (ref 4–33)
ANION GAP SERPL CALC-SCNC: 13 MMOL/L — SIGNIFICANT CHANGE UP (ref 7–14)
APPEARANCE UR: ABNORMAL
APTT BLD: 28 SEC — SIGNIFICANT CHANGE UP (ref 27–36.3)
AST SERPL-CCNC: 35 U/L — HIGH (ref 4–32)
BACTERIA # UR AUTO: ABNORMAL
BASOPHILS # BLD AUTO: 0.01 K/UL — SIGNIFICANT CHANGE UP (ref 0–0.2)
BASOPHILS NFR BLD AUTO: 0.1 % — SIGNIFICANT CHANGE UP (ref 0–2)
BILIRUB SERPL-MCNC: 0.4 MG/DL — SIGNIFICANT CHANGE UP (ref 0.2–1.2)
BILIRUB UR-MCNC: NEGATIVE — SIGNIFICANT CHANGE UP
BUN SERPL-MCNC: 10 MG/DL — SIGNIFICANT CHANGE UP (ref 7–23)
CALCIUM SERPL-MCNC: 9 MG/DL — SIGNIFICANT CHANGE UP (ref 8.4–10.5)
CHLORIDE SERPL-SCNC: 101 MMOL/L — SIGNIFICANT CHANGE UP (ref 98–107)
CO2 SERPL-SCNC: 20 MMOL/L — LOW (ref 22–31)
COLOR SPEC: SIGNIFICANT CHANGE UP
CREAT ?TM UR-MCNC: 68 MG/DL — SIGNIFICANT CHANGE UP
CREAT SERPL-MCNC: 0.97 MG/DL — SIGNIFICANT CHANGE UP (ref 0.5–1.3)
DIFF PNL FLD: ABNORMAL
EGFR: 80 ML/MIN/1.73M2 — SIGNIFICANT CHANGE UP
EOSINOPHIL # BLD AUTO: 0.03 K/UL — SIGNIFICANT CHANGE UP (ref 0–0.5)
EOSINOPHIL NFR BLD AUTO: 0.2 % — SIGNIFICANT CHANGE UP (ref 0–6)
EPI CELLS # UR: 2 /HPF — SIGNIFICANT CHANGE UP (ref 0–5)
FIBRINOGEN PPP-MCNC: 469 MG/DL — HIGH (ref 200–465)
GLUCOSE SERPL-MCNC: 89 MG/DL — SIGNIFICANT CHANGE UP (ref 70–99)
GLUCOSE UR QL: NEGATIVE — SIGNIFICANT CHANGE UP
HCT VFR BLD CALC: 35.2 % — SIGNIFICANT CHANGE UP (ref 34.5–45)
HGB BLD-MCNC: 11.4 G/DL — LOW (ref 11.5–15.5)
HYALINE CASTS # UR AUTO: 0 /LPF — SIGNIFICANT CHANGE UP (ref 0–7)
IANC: 10.33 K/UL — HIGH (ref 1.8–7.4)
IMM GRANULOCYTES NFR BLD AUTO: 0.5 % — SIGNIFICANT CHANGE UP (ref 0–0.9)
INR BLD: 1.07 RATIO — SIGNIFICANT CHANGE UP (ref 0.88–1.16)
KETONES UR-MCNC: ABNORMAL
LDH SERPL L TO P-CCNC: 157 U/L — SIGNIFICANT CHANGE UP (ref 135–225)
LEUKOCYTE ESTERASE UR-ACNC: ABNORMAL
LYMPHOCYTES # BLD AUTO: 1.16 K/UL — SIGNIFICANT CHANGE UP (ref 1–3.3)
LYMPHOCYTES # BLD AUTO: 9.3 % — LOW (ref 13–44)
MCHC RBC-ENTMCNC: 29.8 PG — SIGNIFICANT CHANGE UP (ref 27–34)
MCHC RBC-ENTMCNC: 32.4 GM/DL — SIGNIFICANT CHANGE UP (ref 32–36)
MCV RBC AUTO: 91.9 FL — SIGNIFICANT CHANGE UP (ref 80–100)
MONOCYTES # BLD AUTO: 0.9 K/UL — SIGNIFICANT CHANGE UP (ref 0–0.9)
MONOCYTES NFR BLD AUTO: 7.2 % — SIGNIFICANT CHANGE UP (ref 2–14)
NEUTROPHILS # BLD AUTO: 10.33 K/UL — HIGH (ref 1.8–7.4)
NEUTROPHILS NFR BLD AUTO: 82.7 % — HIGH (ref 43–77)
NITRITE UR-MCNC: NEGATIVE — SIGNIFICANT CHANGE UP
NRBC # BLD: 0 /100 WBCS — SIGNIFICANT CHANGE UP (ref 0–0)
NRBC # FLD: 0 K/UL — SIGNIFICANT CHANGE UP (ref 0–0)
PH UR: 6.5 — SIGNIFICANT CHANGE UP (ref 5–8)
PLATELET # BLD AUTO: 192 K/UL — SIGNIFICANT CHANGE UP (ref 150–400)
POTASSIUM SERPL-MCNC: 4.3 MMOL/L — SIGNIFICANT CHANGE UP (ref 3.5–5.3)
POTASSIUM SERPL-SCNC: 4.3 MMOL/L — SIGNIFICANT CHANGE UP (ref 3.5–5.3)
PROT ?TM UR-MCNC: 57 MG/DL — SIGNIFICANT CHANGE UP
PROT ?TM UR-MCNC: 57 MG/DL — SIGNIFICANT CHANGE UP
PROT SERPL-MCNC: 5.6 G/DL — LOW (ref 6–8.3)
PROT UR-MCNC: ABNORMAL
PROT/CREAT UR-RTO: 0.8 RATIO — HIGH (ref 0–0.2)
PROTHROM AB SERPL-ACNC: 12.4 SEC — SIGNIFICANT CHANGE UP (ref 10.5–13.4)
RBC # BLD: 3.83 M/UL — SIGNIFICANT CHANGE UP (ref 3.8–5.2)
RBC # FLD: 12.8 % — SIGNIFICANT CHANGE UP (ref 10.3–14.5)
RBC CASTS # UR COMP ASSIST: SIGNIFICANT CHANGE UP /HPF (ref 0–4)
RUBV IGG SER-ACNC: 1.9 INDEX — SIGNIFICANT CHANGE UP
RUBV IGG SER-IMP: POSITIVE — SIGNIFICANT CHANGE UP
SODIUM SERPL-SCNC: 134 MMOL/L — LOW (ref 135–145)
SP GR SPEC: 1.01 — SIGNIFICANT CHANGE UP (ref 1.01–1.05)
T PALLIDUM AB TITR SER: NEGATIVE — SIGNIFICANT CHANGE UP
URATE SERPL-MCNC: 5 MG/DL — SIGNIFICANT CHANGE UP (ref 2.5–7)
UROBILINOGEN FLD QL: SIGNIFICANT CHANGE UP
WBC # BLD: 12.49 K/UL — HIGH (ref 3.8–10.5)
WBC # FLD AUTO: 12.49 K/UL — HIGH (ref 3.8–10.5)
WBC UR QL: 7 /HPF — HIGH (ref 0–5)

## 2023-06-20 PROCEDURE — 88307 TISSUE EXAM BY PATHOLOGIST: CPT | Mod: 26

## 2023-06-20 RX ORDER — LANOLIN
1 OINTMENT (GRAM) TOPICAL EVERY 6 HOURS
Refills: 0 | Status: DISCONTINUED | OUTPATIENT
Start: 2023-06-20 | End: 2023-06-22

## 2023-06-20 RX ORDER — TETANUS TOXOID, REDUCED DIPHTHERIA TOXOID AND ACELLULAR PERTUSSIS VACCINE, ADSORBED 5; 2.5; 8; 8; 2.5 [IU]/.5ML; [IU]/.5ML; UG/.5ML; UG/.5ML; UG/.5ML
0.5 SUSPENSION INTRAMUSCULAR ONCE
Refills: 0 | Status: DISCONTINUED | OUTPATIENT
Start: 2023-06-20 | End: 2023-06-22

## 2023-06-20 RX ORDER — HYDROCORTISONE 1 %
1 OINTMENT (GRAM) TOPICAL EVERY 6 HOURS
Refills: 0 | Status: DISCONTINUED | OUTPATIENT
Start: 2023-06-20 | End: 2023-06-22

## 2023-06-20 RX ORDER — ACETAMINOPHEN 500 MG
975 TABLET ORAL
Refills: 0 | Status: DISCONTINUED | OUTPATIENT
Start: 2023-06-20 | End: 2023-06-22

## 2023-06-20 RX ORDER — OXYCODONE HYDROCHLORIDE 5 MG/1
5 TABLET ORAL ONCE
Refills: 0 | Status: DISCONTINUED | OUTPATIENT
Start: 2023-06-20 | End: 2023-06-22

## 2023-06-20 RX ORDER — KETOROLAC TROMETHAMINE 30 MG/ML
30 SYRINGE (ML) INJECTION ONCE
Refills: 0 | Status: DISCONTINUED | OUTPATIENT
Start: 2023-06-20 | End: 2023-06-21

## 2023-06-20 RX ORDER — OXYCODONE HYDROCHLORIDE 5 MG/1
5 TABLET ORAL
Refills: 0 | Status: DISCONTINUED | OUTPATIENT
Start: 2023-06-20 | End: 2023-06-22

## 2023-06-20 RX ORDER — SODIUM CHLORIDE 9 MG/ML
300 INJECTION INTRAMUSCULAR; INTRAVENOUS; SUBCUTANEOUS ONCE
Refills: 0 | Status: COMPLETED | OUTPATIENT
Start: 2023-06-20 | End: 2023-06-20

## 2023-06-20 RX ORDER — ONDANSETRON 8 MG/1
4 TABLET, FILM COATED ORAL ONCE
Refills: 0 | Status: DISCONTINUED | OUTPATIENT
Start: 2023-06-20 | End: 2023-06-22

## 2023-06-20 RX ORDER — SODIUM CHLORIDE 9 MG/ML
3 INJECTION INTRAMUSCULAR; INTRAVENOUS; SUBCUTANEOUS EVERY 8 HOURS
Refills: 0 | Status: DISCONTINUED | OUTPATIENT
Start: 2023-06-20 | End: 2023-06-22

## 2023-06-20 RX ORDER — DIPHENHYDRAMINE HCL 50 MG
25 CAPSULE ORAL EVERY 6 HOURS
Refills: 0 | Status: DISCONTINUED | OUTPATIENT
Start: 2023-06-20 | End: 2023-06-22

## 2023-06-20 RX ORDER — MAGNESIUM HYDROXIDE 400 MG/1
30 TABLET, CHEWABLE ORAL
Refills: 0 | Status: DISCONTINUED | OUTPATIENT
Start: 2023-06-20 | End: 2023-06-22

## 2023-06-20 RX ORDER — IBUPROFEN 200 MG
600 TABLET ORAL EVERY 6 HOURS
Refills: 0 | Status: DISCONTINUED | OUTPATIENT
Start: 2023-06-20 | End: 2023-06-22

## 2023-06-20 RX ORDER — BENZOCAINE 10 %
1 GEL (GRAM) MUCOUS MEMBRANE EVERY 6 HOURS
Refills: 0 | Status: DISCONTINUED | OUTPATIENT
Start: 2023-06-20 | End: 2023-06-22

## 2023-06-20 RX ORDER — DIBUCAINE 1 %
1 OINTMENT (GRAM) RECTAL EVERY 6 HOURS
Refills: 0 | Status: DISCONTINUED | OUTPATIENT
Start: 2023-06-20 | End: 2023-06-22

## 2023-06-20 RX ORDER — PRAMOXINE HYDROCHLORIDE 150 MG/15G
1 AEROSOL, FOAM RECTAL EVERY 4 HOURS
Refills: 0 | Status: DISCONTINUED | OUTPATIENT
Start: 2023-06-20 | End: 2023-06-22

## 2023-06-20 RX ORDER — OXYTOCIN 10 UNIT/ML
2 VIAL (ML) INJECTION
Qty: 30 | Refills: 0 | Status: DISCONTINUED | OUTPATIENT
Start: 2023-06-20 | End: 2023-06-21

## 2023-06-20 RX ORDER — SODIUM CHLORIDE 9 MG/ML
1000 INJECTION INTRAMUSCULAR; INTRAVENOUS; SUBCUTANEOUS
Refills: 0 | Status: DISCONTINUED | OUTPATIENT
Start: 2023-06-20 | End: 2023-06-22

## 2023-06-20 RX ORDER — ONDANSETRON 8 MG/1
4 TABLET, FILM COATED ORAL ONCE
Refills: 0 | Status: COMPLETED | OUTPATIENT
Start: 2023-06-20 | End: 2023-06-20

## 2023-06-20 RX ORDER — IBUPROFEN 200 MG
600 TABLET ORAL EVERY 6 HOURS
Refills: 0 | Status: COMPLETED | OUTPATIENT
Start: 2023-06-20 | End: 2024-05-18

## 2023-06-20 RX ORDER — AER TRAVELER 0.5 G/1
1 SOLUTION RECTAL; TOPICAL EVERY 4 HOURS
Refills: 0 | Status: DISCONTINUED | OUTPATIENT
Start: 2023-06-20 | End: 2023-06-22

## 2023-06-20 RX ORDER — SIMETHICONE 80 MG/1
80 TABLET, CHEWABLE ORAL EVERY 4 HOURS
Refills: 0 | Status: DISCONTINUED | OUTPATIENT
Start: 2023-06-20 | End: 2023-06-22

## 2023-06-20 RX ORDER — OXYTOCIN 10 UNIT/ML
2 VIAL (ML) INJECTION
Qty: 30 | Refills: 0 | Status: DISCONTINUED | OUTPATIENT
Start: 2023-06-20 | End: 2023-06-20

## 2023-06-20 RX ORDER — OXYTOCIN 10 UNIT/ML
41.67 VIAL (ML) INJECTION
Qty: 20 | Refills: 0 | Status: DISCONTINUED | OUTPATIENT
Start: 2023-06-20 | End: 2023-06-21

## 2023-06-20 RX ADMIN — Medication 150 MILLIGRAM(S): at 12:28

## 2023-06-20 RX ADMIN — Medication 125 MILLIUNIT(S)/MIN: at 20:24

## 2023-06-20 RX ADMIN — Medication 1000 MILLIUNIT(S)/MIN: at 18:28

## 2023-06-20 RX ADMIN — SODIUM CHLORIDE 900 MILLILITER(S): 9 INJECTION INTRAMUSCULAR; INTRAVENOUS; SUBCUTANEOUS at 05:30

## 2023-06-20 RX ADMIN — CHLORHEXIDINE GLUCONATE 1 APPLICATION(S): 213 SOLUTION TOPICAL at 12:00

## 2023-06-20 RX ADMIN — SODIUM CHLORIDE 3 MILLILITER(S): 9 INJECTION INTRAMUSCULAR; INTRAVENOUS; SUBCUTANEOUS at 21:45

## 2023-06-20 RX ADMIN — Medication 2 MILLIUNIT(S)/MIN: at 01:37

## 2023-06-20 RX ADMIN — CHLORHEXIDINE GLUCONATE 1 APPLICATION(S): 213 SOLUTION TOPICAL at 03:24

## 2023-06-20 RX ADMIN — Medication 2 MILLIUNIT(S)/MIN: at 12:37

## 2023-06-20 RX ADMIN — SODIUM CHLORIDE 125 MILLILITER(S): 9 INJECTION INTRAMUSCULAR; INTRAVENOUS; SUBCUTANEOUS at 06:36

## 2023-06-20 RX ADMIN — ONDANSETRON 4 MILLIGRAM(S): 8 TABLET, FILM COATED ORAL at 12:43

## 2023-06-20 NOTE — OB RN DELIVERY SUMMARY - NS_DELIVERYNEONATOLOGIST1_OBGYN_ALL_OB_FT
----- Message from Subha Rose MD sent at 9/13/2017 11:14 AM CDT -----  Please inform the patient that his labs from 9/8/17 were normal, thus continue with the current dose of Methimazole with repeat labs as planned.   Kian

## 2023-06-20 NOTE — OB RN DELIVERY SUMMARY - NSSELHIDDEN_OBGYN_ALL_OB_FT
[NS_DeliveryAttending1_OBGYN_ALL_OB_FT:[id number]],[NS_DeliveryAssist1_OBGYN_ALL_OB_FT:[id number]],[NS_DeliveryRN_OBGYN_ALL_OB_FT:[id number]]

## 2023-06-20 NOTE — OB NEONATOLOGY/PEDIATRICIAN DELIVERY SUMMARY - NSLABSCHECK_OBGYN_ALL_OB
Body Location Override (Optional - Billing Will Still Be Based On Selected Body Map Location If Applicable): right medial canthus Patient Name (Optional- Will Render 'the Patient' If Blank): Nia Barney Mohs Case Number:  Date Of Previous Biopsy (Optional): 5/20/20 Previous Accession (Optional): [id number] Biopsy Photograph Reviewed: Yes Referring Physician (Optional): Dr. Mendoza Consent Type: Consent 1 (Standard) Eye Shield Used: No Surgeon Performing Repair (Optional): Dr. Mendoza Initial Size Of Lesion: 0.4 Number Of Stages: 2 Primary Defect Length In Cm (Final Defect Size - Required For Flaps/Grafts): 0.8 Repair Type: Referred to plastics for closure Oculoplastic Surgeon Procedure Text (A): After obtaining clear surgical margins the patient was sent to oculoplastics for surgical repair.  The patient understands they will receive post-surgical care and follow-up from the referring physician's office. Oculoplastic Surgeon Procedure Text (B): After obtaining clear surgical margins the patient was sent to oculoplastics for surgical repair.  The patient understands they will receive post-surgical care and follow-up from the referring physician's office. Otolaryngologist Procedure Text (A): After obtaining clear surgical margins the patient was sent to otolaryngology for surgical repair.  The patient understands they will receive post-surgical care and follow-up from the referring physician's office. Otolaryngologist Procedure Text (B): After obtaining clear surgical margins the patient was sent to otolaryngology for surgical repair.  The patient understands they will receive post-surgical care and follow-up from the referring physician's office. Which Plastic Surgeon Are You Referring To?: A Plastic Surgeon (A): Dr. Mendoza at 11:30 AM on 6/16/20. Plastic Surgeon Procedure Text (A): After obtaining clear surgical margins the patient was sent to plastics for surgical repair.  The patient understands they will receive post-surgical care and follow-up from the referring physician's office. Plastic Surgeon Procedure Text (B): After obtaining clear surgical margins the patient was sent to plastics for surgical repair.  The patient understands they will receive post-surgical care and follow-up from the referring physician's office. Mid-Level Procedure Text (A): After obtaining clear surgical margins the patient was sent to a mid-level provider for surgical repair.  The patient understands they will receive post-surgical care and follow-up from the mid-level provider. Mid-Level Procedure Text (B): After obtaining clear surgical margins the patient was sent to a mid-level provider for surgical repair.  The patient understands they will receive post-surgical care and follow-up from the mid-level provider. Provider Procedure Text (A): After obtaining clear surgical margins the defect was repaired by another provider. Yes Asc Procedure Text (A): After obtaining clear surgical margins the patient was sent to an ASC for surgical repair.  The patient understands they will receive post-surgical care and follow-up from the ASC physician. Asc Procedure Text (B): After obtaining clear surgical margins the patient was sent to an ASC for surgical repair.  The patient understands they will receive post-surgical care and follow-up from the ASC physician. Suturegard Retention Suture: 2-0 Nylon Retention Suture Bite Size: 3 mm Length To Time In Minutes Device Was In Place: 10 Simple / Intermediate / Complex Repair - Final Wound Length In Cm: 0 Undermining Type: Entire Wound Debridement Text: The wound edges were debrided prior to proceeding with the closure to facilitate wound healing. Helical Rim Text: The closure involved the helical rim. Vermilion Border Text: The closure involved the vermilion border. Nostril Rim Text: The closure involved the nostril rim. Retention Suture Text: Retention sutures were placed to support the closure and prevent dehiscence. Location Indication Override (Is Already Calculated Based On Selected Body Location): Area H Area H Indication Text: Tumors in this location are included in Area H (eyelids, eyebrows, nose, lips, chin, ear, pre-auricular, post-auricular, temple, genitalia, hands, feet, ankles and areola).  Tissue conservation is critical in these anatomic locations. Area M Indication Text: Tumors in this location are included in Area M (cheek, forehead, scalp, neck, jawline and pretibial skin).  Mohs surgery is indicated for tumors in these anatomic locations. Area L Indication Text: Tumors in this location are included in Area L (trunk and extremities).  Mohs surgery is indicated for larger tumors, or tumors with aggressive histologic features, in these anatomic locations. Tumor Debulked?: curette Special Stains Stage 1 - Results: Base On Clearance Noted Above Histology Selection Override (Optional- Will Default To Parent Diagnosis If N/A): Nodular Basal Cell Carcinoma Stage 2: Number Of Blocks?: 1 Stage 2: Additional Anesthesia Type: 1% lidocaine with epinephrine Staging Info: By selecting yes to the question above you will include information on AJCC 8 tumor staging in your Mohs note. Information on tumor staging will be automatically added for SCCs on the head and neck. AJCC 8 includes tumor size, tumor depth, perineural involvement and bone invasion. Tumor Depth: Less than 6mm from granular layer and no invasion beyond the subcutaneous fat Was The Patient On Physician Recommended Anticoagulation Therapy?: Please Select the Appropriate Response Medical Necessity Statement: Based on my medical judgement, Mohs surgery is the most appropriate treatment for this cancer compared to other treatments. Alternatives Discussed Intro (Do Not Add Period): I discussed alternative treatments to Mohs surgery and specifically discussed the risks and benefits of Consent 1/Introductory Paragraph: The rationale for Mohs was explained to the patient and consent was obtained. The risks, benefits and alternatives to therapy were discussed in detail. Specifically, the risks of infection, scarring, bleeding, prolonged wound healing, incomplete removal, allergy to anesthesia, nerve injury and recurrence were addressed. Prior to the procedure, the treatment site was clearly identified and confirmed by the patient. All components of Universal Protocol/PAUSE Rule completed. Consent 2/Introductory Paragraph: Mohs surgery was explained to the patient and consent was obtained. The risks, benefits and alternatives to therapy were discussed in detail. Specifically, the risks of infection, scarring, bleeding, prolonged wound healing, incomplete removal, allergy to anesthesia, nerve injury and recurrence were addressed. Prior to the procedure, the treatment site was clearly identified and confirmed by the patient. All components of Universal Protocol/PAUSE Rule completed. Consent 3/Introductory Paragraph: I gave the patient a chance to ask questions they had about the procedure.  Following this I explained the Mohs procedure and consent was obtained. The risks, benefits and alternatives to therapy were discussed in detail. Specifically, the risks of infection, scarring, bleeding, prolonged wound healing, incomplete removal, allergy to anesthesia, nerve injury and recurrence were addressed. Prior to the procedure, the treatment site was clearly identified and confirmed by the patient. All components of Universal Protocol/PAUSE Rule completed. Consent (Temporal Branch)/Introductory Paragraph: The rationale for Mohs was explained to the patient and consent was obtained. The risks, benefits and alternatives to therapy were discussed in detail. Specifically, the risks of damage to the temporal branch of the facial nerve, infection, scarring, bleeding, prolonged wound healing, incomplete removal, allergy to anesthesia, and recurrence were addressed. Prior to the procedure, the treatment site was clearly identified and confirmed by the patient. All components of Universal Protocol/PAUSE Rule completed. Consent (Marginal Mandibular)/Introductory Paragraph: The rationale for Mohs was explained to the patient and consent was obtained. The risks, benefits and alternatives to therapy were discussed in detail. Specifically, the risks of damage to the marginal mandibular branch of the facial nerve, infection, scarring, bleeding, prolonged wound healing, incomplete removal, allergy to anesthesia, and recurrence were addressed. Prior to the procedure, the treatment site was clearly identified and confirmed by the patient. All components of Universal Protocol/PAUSE Rule completed. Consent (Spinal Accessory)/Introductory Paragraph: The rationale for Mohs was explained to the patient and consent was obtained. The risks, benefits and alternatives to therapy were discussed in detail. Specifically, the risks of damage to the spinal accessory nerve, infection, scarring, bleeding, prolonged wound healing, incomplete removal, allergy to anesthesia, and recurrence were addressed. Prior to the procedure, the treatment site was clearly identified and confirmed by the patient. All components of Universal Protocol/PAUSE Rule completed. Consent (Near Eyelid Margin)/Introductory Paragraph: The rationale for Mohs was explained to the patient and consent was obtained. The risks, benefits and alternatives to therapy were discussed in detail. Specifically, the risks of ectropion or eyelid deformity, infection, scarring, bleeding, prolonged wound healing, incomplete removal, allergy to anesthesia, nerve injury and recurrence were addressed. Prior to the procedure, the treatment site was clearly identified and confirmed by the patient. All components of Universal Protocol/PAUSE Rule completed. Consent (Ear)/Introductory Paragraph: The rationale for Mohs was explained to the patient and consent was obtained. The risks, benefits and alternatives to therapy were discussed in detail. Specifically, the risks of ear deformity, infection, scarring, bleeding, prolonged wound healing, incomplete removal, allergy to anesthesia, nerve injury and recurrence were addressed. Prior to the procedure, the treatment site was clearly identified and confirmed by the patient. All components of Universal Protocol/PAUSE Rule completed. Consent (Nose)/Introductory Paragraph: The rationale for Mohs was explained to the patient and consent was obtained. The risks, benefits and alternatives to therapy were discussed in detail. Specifically, the risks of nasal deformity, changes in the flow of air through the nose, infection, scarring, bleeding, prolonged wound healing, incomplete removal, allergy to anesthesia, nerve injury and recurrence were addressed. Prior to the procedure, the treatment site was clearly identified and confirmed by the patient. All components of Universal Protocol/PAUSE Rule completed. Consent (Lip)/Introductory Paragraph: The rationale for Mohs was explained to the patient and consent was obtained. The risks, benefits and alternatives to therapy were discussed in detail. Specifically, the risks of lip deformity, changes in the oral aperture, infection, scarring, bleeding, prolonged wound healing, incomplete removal, allergy to anesthesia, nerve injury and recurrence were addressed. Prior to the procedure, the treatment site was clearly identified and confirmed by the patient. All components of Universal Protocol/PAUSE Rule completed. Consent (Scalp)/Introductory Paragraph: The rationale for Mohs was explained to the patient and consent was obtained. The risks, benefits and alternatives to therapy were discussed in detail. Specifically, the risks of changes in hair growth pattern secondary to repair, infection, scarring, bleeding, prolonged wound healing, incomplete removal, allergy to anesthesia, nerve injury and recurrence were addressed. Prior to the procedure, the treatment site was clearly identified and confirmed by the patient. All components of Universal Protocol/PAUSE Rule completed. Detail Level: Detailed Postop Diagnosis: same Surgeon: Filiberto Rincon M.D. Anesthesia Type: 1% lidocaine with 1:100,000 epinephrine and a 1:10 solution of 8.4% sodium bicarbonate Anesthesia Volume In Cc: 6 Hemostasis: Electrocautery Estimated Blood Loss (Cc): minimal Anesthesia Type: 1% lidocaine with epinephrine and a 1:10 solution of 8.4% sodium bicarbonate Deep Sutures: 5-0 Monocryl Epidermal Sutures: 6-0 Prolene Epidermal Closure: running Suturegard Intro: Intraoperative tissue expansion was performed, utilizing the SUTUREGARD device, in order to reduce wound tension. Suturegard Body: The suture ends were repeatedly re-tightened and re-clamped to achieve the desired tissue expansion. Donor Site Anesthesia Type: same as repair anesthesia Epidermal Closure Graft Donor Site (Optional): running and interrupted Graft Donor Site Bandage (Optional-Leave Blank If You Don't Want In Note): Steri-strips and a pressure bandage were applied to the donor site. Closure 2 Information: This tab is for additional flaps and grafts, including complex repair and grafts and complex repair and flaps. You can also specify a different location for the additional defect, if the location is the same you do not need to select a new one. We will insert the automated text for the repair you select below just as we do for solitary flaps and grafts. Please note that at this time if you select a location with a different insurance zone you will need to override the ICD10 and CPT if appropriate. Closure 3 Information: This tab is for additional flaps and grafts above and beyond our usual structured repairs.  Please note if you enter information here it will not currently bill and you will need to add the billing information manually. Closure 4 Information: This tab is for additional flaps and grafts above and beyond our usual structured repairs.  Please note if you enter information here it will not currently bill and you will need to add the billing information manually. Wound Care: Mupirocin Dressing: pressure dressing with telfa Wound Care (No Sutures): Petrolatum Dressing (No Sutures): dry sterile dressing Wound Check: 6 weeks Unna Boot Text: An Unna boot was placed to help immobilize the limb and facilitate more rapid healing. Home Suture Removal Text: Patient was provided instructions on removing sutures and will remove their sutures at home.  If they have any questions or difficulties they will call the office. Post-Care Instructions: I reviewed with the patient in detail post-care instructions. Patient is not to engage in any heavy lifting, exercise, travel or swimming for the next 7 days. Should the patient develop any fevers, chills, bleeding, severe pain patient will contact the office immediately. Pain Refusal Text: I offered to prescribe pain medication but the patient refused to take this medication. Mauc Instructions: By selecting yes to the question below the MAUC number will be added into the note.  This will be calculated automatically based on the diagnosis chosen, the size entered, the body zone selected (H,M,L) and the specific indications you chose. You will also have the option to override the Mohs AUC if you disagree with the automatically calculated number and this option is found in the Case Summary tab. Where Do You Want The Question To Include Opioid Counseling Located?: Case Summary Tab Eye Protection Verbiage: Before proceeding with the stage, a plastic scleral shield was inserted. The globe was anesthetized with a few drops of 1% lidocaine with 1:100,000 epinephrine. Then, an appropriate sized scleral shield was chosen and coated with lacrilube ointment. The shield was gently inserted and left in place for the duration of each stage. After the stage was completed, the shield was gently removed. Mohs Method Verbiage: An incision at a 45 degree angle following the standard Mohs approach was done and the specimen was harvested as a microscopic controlled layer. Surgeon/Pathologist Verbiage (Will Incorporate Name Of Surgeon From Intro If Not Blank): operated in two distinct and integrated capacities as the surgeon and pathologist. Mohs Histo Method Verbiage: Each section was then chromacoded and processed in the Mohs lab using the Mohs protocol and submitted for frozen section. Subsequent Stages Histo Method Verbiage: Using a similar technique to that described above, a thin layer of tissue was removed from all areas where tumor was visible on the previous stage.  The tissue was again oriented, mapped, dyed, and processed as above. Mohs Rapid Report Verbiage: The area of clinically evident tumor was marked with skin marking ink and appropriately hatched.  The initial incision was made following the Mohs approach through the skin.  The specimen was taken to the lab, divided into the necessary number of pieces, chromacoded and processed according to the Mohs protocol.  This was repeated in successive stages until a tumor free defect was achieved. Complex Repair Preamble Text (Leave Blank If You Do Not Want): Extensive wide undermining was performed. Intermediate Repair Preamble Text (Leave Blank If You Do Not Want): Undermining was performed with blunt dissection. Non-Graft Cartilage Fenestration Text: The cartilage was fenestrated with a 2mm punch biopsy to help facilitate healing. Graft Cartilage Fenestration Text: The cartilage was fenestrated with a 2mm punch biopsy to help facilitate graft survival and healing. Secondary Intention Text (Leave Blank If You Do Not Want): The defect will heal with secondary intention. No Repair - Repaired With Adjacent Surgical Defect Text (Leave Blank If You Do Not Want): After obtaining clear surgical margins the defect was repaired concurrently with another surgical defect which was in close approximation. Advancement Flap (Single) Text: The defect edges were debeveled with a #15 scalpel blade.  Given the location of the defect and the proximity to free margins a single advancement flap was deemed most appropriate.  Using a sterile surgical marker, an appropriate advancement flap was drawn incorporating the defect and placing the expected incisions within the relaxed skin tension lines where possible.    The area thus outlined was incised deep to adipose tissue with a #15 scalpel blade.  The skin margins were undermined to an appropriate distance in all directions utilizing iris scissors. Advancement Flap (Double) Text: The defect edges were debeveled with a #15 scalpel blade.  Given the location of the defect and the proximity to free margins a double advancement flap was deemed most appropriate.  Using a sterile surgical marker, the appropriate advancement flaps were drawn incorporating the defect and placing the expected incisions within the relaxed skin tension lines where possible.    The area thus outlined was incised deep to adipose tissue with a #15 scalpel blade.  The skin margins were undermined to an appropriate distance in all directions utilizing iris scissors. Burow's Advancement Flap Text: The defect edges were debeveled with a #15 scalpel blade.  Given the location of the defect and the proximity to free margins a Burow's advancement flap was deemed most appropriate.  Using a sterile surgical marker, the appropriate advancement flap was drawn incorporating the defect and placing the expected incisions within the relaxed skin tension lines where possible.    The area thus outlined was incised deep to adipose tissue with a #15 scalpel blade.  The skin margins were undermined to an appropriate distance in all directions utilizing iris scissors. Chonodrocutaneous Helical Advancement Flap Text: The defect edges were debeveled with a #15 scalpel blade.  Given the location of the defect and the proximity to free margins a chondrocutaneous helical advancement flap was deemed most appropriate.  Using a sterile surgical marker, the appropriate advancement flap was drawn incorporating the defect and placing the expected incisions within the relaxed skin tension lines where possible.    The area thus outlined was incised deep to adipose tissue with a #15 scalpel blade.  The skin margins were undermined to an appropriate distance in all directions utilizing iris scissors. Crescentic Advancement Flap Text: The defect edges were debeveled with a #15 scalpel blade.  Given the location of the defect and the proximity to free margins a crescentic advancement flap was deemed most appropriate.  Using a sterile surgical marker, the appropriate advancement flap was drawn incorporating the defect and placing the expected incisions within the relaxed skin tension lines where possible.    The area thus outlined was incised deep to adipose tissue with a #15 scalpel blade.  The skin margins were undermined to an appropriate distance in all directions utilizing iris scissors. A-T Advancement Flap Text: The defect edges were debeveled with a #15 scalpel blade.  Given the location of the defect, shape of the defect and the proximity to free margins an A-T advancement flap was deemed most appropriate.  Using a sterile surgical marker, an appropriate advancement flap was drawn incorporating the defect and placing the expected incisions within the relaxed skin tension lines where possible.    The area thus outlined was incised deep to adipose tissue with a #15 scalpel blade.  The skin margins were undermined to an appropriate distance in all directions utilizing iris scissors. O-T Advancement Flap Text: The defect edges were debeveled with a #15 scalpel blade.  Given the location of the defect, shape of the defect and the proximity to free margins an O-T advancement flap was deemed most appropriate.  Using a sterile surgical marker, an appropriate advancement flap was drawn incorporating the defect and placing the expected incisions within the relaxed skin tension lines where possible.    The area thus outlined was incised deep to adipose tissue with a #15 scalpel blade.  The skin margins were undermined to an appropriate distance in all directions utilizing iris scissors. O-L Flap Text: The defect edges were debeveled with a #15 scalpel blade.  Given the location of the defect, shape of the defect and the proximity to free margins an O-L flap was deemed most appropriate.  Using a sterile surgical marker, an appropriate advancement flap was drawn incorporating the defect and placing the expected incisions within the relaxed skin tension lines where possible.    The area thus outlined was incised deep to adipose tissue with a #15 scalpel blade.  The skin margins were undermined to an appropriate distance in all directions utilizing iris scissors. O-Z Flap Text: The defect edges were debeveled with a #15 scalpel blade.  Given the location of the defect, shape of the defect and the proximity to free margins an O-Z flap was deemed most appropriate.  Using a sterile surgical marker, an appropriate transposition flap was drawn incorporating the defect and placing the expected incisions within the relaxed skin tension lines where possible. The area thus outlined was incised deep to adipose tissue with a #15 scalpel blade.  The skin margins were undermined to an appropriate distance in all directions utilizing iris scissors. Double O-Z Flap Text: The defect edges were debeveled with a #15 scalpel blade.  Given the location of the defect, shape of the defect and the proximity to free margins a Double O-Z flap was deemed most appropriate.  Using a sterile surgical marker, an appropriate transposition flap was drawn incorporating the defect and placing the expected incisions within the relaxed skin tension lines where possible. The area thus outlined was incised deep to adipose tissue with a #15 scalpel blade.  The skin margins were undermined to an appropriate distance in all directions utilizing iris scissors. V-Y Flap Text: The defect edges were debeveled with a #15 scalpel blade.  Given the location of the defect, shape of the defect and the proximity to free margins a V-Y flap was deemed most appropriate.  Using a sterile surgical marker, an appropriate advancement flap was drawn incorporating the defect and placing the expected incisions within the relaxed skin tension lines where possible.    The area thus outlined was incised deep to adipose tissue with a #15 scalpel blade.  The skin margins were undermined to an appropriate distance in all directions utilizing iris scissors. Advancement-Rotation Flap Text: The defect edges were debeveled with a #15 scalpel blade.  Given the location of the defect, shape of the defect and the proximity to free margins an advancement-rotation flap was deemed most appropriate.  Using a sterile surgical marker, an appropriate flap was drawn incorporating the defect and placing the expected incisions within the relaxed skin tension lines where possible. The area thus outlined was incised deep to adipose tissue with a #15 scalpel blade.  The skin margins were undermined to an appropriate distance in all directions utilizing iris scissors. Mercedes Flap Text: The defect edges were debeveled with a #15 scalpel blade.  Given the location of the defect, shape of the defect and the proximity to free margins a Mercedes flap was deemed most appropriate.  Using a sterile surgical marker, an appropriate advancement flap was drawn incorporating the defect and placing the expected incisions within the relaxed skin tension lines where possible. The area thus outlined was incised deep to adipose tissue with a #15 scalpel blade.  The skin margins were undermined to an appropriate distance in all directions utilizing iris scissors. Modified Advancement Flap Text: The defect edges were debeveled with a #15 scalpel blade.  Given the location of the defect, shape of the defect and the proximity to free margins a modified advancement flap was deemed most appropriate.  Using a sterile surgical marker, an appropriate advancement flap was drawn incorporating the defect and placing the expected incisions within the relaxed skin tension lines where possible.    The area thus outlined was incised deep to adipose tissue with a #15 scalpel blade.  The skin margins were undermined to an appropriate distance in all directions utilizing iris scissors. Mucosal Advancement Flap Text: Given the location of the defect, shape of the defect and the proximity to free margins a mucosal advancement flap was deemed most appropriate. Incisions were made with a 15 blade scalpel in the appropriate fashion along the cutaneous vermillion border and the mucosal lip. The remaining actinically damaged mucosal tissue was excised.  The mucosal advancement flap was then elevated to the gingival sulcus with care taken to preserve the neurovascular structures and advanced into the primary defect. Care was taken to ensure that precise realignment of the vermillion border was achieved. Peng Advancement Flap Text: The defect edges were debeveled with a #15 scalpel blade.  Given the location of the defect, shape of the defect and the proximity to free margins a Peng advancement flap was deemed most appropriate.  Using a sterile surgical marker, an appropriate advancement flap was drawn incorporating the defect and placing the expected incisions within the relaxed skin tension lines where possible. The area thus outlined was incised deep to adipose tissue with a #15 scalpel blade.  The skin margins were undermined to an appropriate distance in all directions utilizing iris scissors. Hatchet Flap Text: The defect edges were debeveled with a #15 scalpel blade.  Given the location of the defect, shape of the defect and the proximity to free margins a hatchet flap was deemed most appropriate.  Using a sterile surgical marker, an appropriate hatchet flap was drawn incorporating the defect and placing the expected incisions within the relaxed skin tension lines where possible.    The area thus outlined was incised deep to adipose tissue with a #15 scalpel blade.  The skin margins were undermined to an appropriate distance in all directions utilizing iris scissors. Rotation Flap Text: The defect edges were debeveled with a #15 scalpel blade.  Given the location of the defect, shape of the defect and the proximity to free margins a rotation flap was deemed most appropriate.  Using a sterile surgical marker, an appropriate rotation flap was drawn incorporating the defect and placing the expected incisions within the relaxed skin tension lines where possible.    The area thus outlined was incised deep to adipose tissue with a #15 scalpel blade.  The skin margins were undermined to an appropriate distance in all directions utilizing iris scissors. Spiral Flap Text: The defect edges were debeveled with a #15 scalpel blade.  Given the location of the defect, shape of the defect and the proximity to free margins a spiral flap was deemed most appropriate.  Using a sterile surgical marker, an appropriate rotation flap was drawn incorporating the defect and placing the expected incisions within the relaxed skin tension lines where possible. The area thus outlined was incised deep to adipose tissue with a #15 scalpel blade.  The skin margins were undermined to an appropriate distance in all directions utilizing iris scissors. Star Wedge Flap Text: The defect edges were debeveled with a #15 scalpel blade.  Given the location of the defect, shape of the defect and the proximity to free margins a star wedge flap was deemed most appropriate.  Using a sterile surgical marker, an appropriate rotation flap was drawn incorporating the defect and placing the expected incisions within the relaxed skin tension lines where possible. The area thus outlined was incised deep to adipose tissue with a #15 scalpel blade.  The skin margins were undermined to an appropriate distance in all directions utilizing iris scissors. Transposition Flap Text: The defect edges were debeveled with a #15 scalpel blade.  Given the location of the defect and the proximity to free margins a transposition flap was deemed most appropriate.  Using a sterile surgical marker, an appropriate transposition flap was drawn incorporating the defect.    The area thus outlined was incised deep to adipose tissue with a #15 scalpel blade.  The skin margins were undermined to an appropriate distance in all directions utilizing iris scissors. Muscle Hinge Flap Text: The defect edges were debeveled with a #15 scalpel blade.  Given the size, depth and location of the defect and the proximity to free margins a muscle hinge flap was deemed most appropriate.  Using a sterile surgical marker, an appropriate hinge flap was drawn incorporating the defect. The area thus outlined was incised with a #15 scalpel blade.  The skin margins were undermined to an appropriate distance in all directions utilizing iris scissors. Melolabial Transposition Flap Text: The defect edges were debeveled with a #15 scalpel blade.  Given the location of the defect and the proximity to free margins a melolabial flap was deemed most appropriate.  Using a sterile surgical marker, an appropriate melolabial transposition flap was drawn incorporating the defect.    The area thus outlined was incised deep to adipose tissue with a #15 scalpel blade.  The skin margins were undermined to an appropriate distance in all directions utilizing iris scissors. Rhombic Flap Text: The defect edges were debeveled with a #15 scalpel blade.  Given the location of the defect and the proximity to free margins a rhombic flap was deemed most appropriate.  Using a sterile surgical marker, an appropriate rhombic flap was drawn incorporating the defect.    The area thus outlined was incised deep to adipose tissue with a #15 scalpel blade.  The skin margins were undermined to an appropriate distance in all directions utilizing iris scissors. Rhomboid Transposition Flap Text: The defect edges were debeveled with a #15 scalpel blade.  Given the location of the defect and the proximity to free margins a rhomboid transposition flap was deemed most appropriate.  Using a sterile surgical marker, an appropriate rhomboid flap was drawn incorporating the defect.    The area thus outlined was incised deep to adipose tissue with a #15 scalpel blade.  The skin margins were undermined to an appropriate distance in all directions utilizing iris scissors. Bi-Rhombic Flap Text: The defect edges were debeveled with a #15 scalpel blade.  Given the location of the defect and the proximity to free margins a bi-rhombic flap was deemed most appropriate.  Using a sterile surgical marker, an appropriate rhombic flap was drawn incorporating the defect. The area thus outlined was incised deep to adipose tissue with a #15 scalpel blade.  The skin margins were undermined to an appropriate distance in all directions utilizing iris scissors. Helical Rim Advancement Flap Text: The defect edges were debeveled with a #15 blade scalpel.  Given the location of the defect and the proximity to free margins (helical rim) a double helical rim advancement flap was deemed most appropriate.  Using a sterile surgical marker, the appropriate advancement flaps were drawn incorporating the defect and placing the expected incisions between the helical rim and antihelix where possible.  The area thus outlined was incised through and through with a #15 scalpel blade.  With a skin hook and iris scissors, the flaps were gently and sharply undermined and freed up. Bilateral Helical Rim Advancement Flap Text: The defect edges were debeveled with a #15 blade scalpel.  Given the location of the defect and the proximity to free margins (helical rim) a bilateral helical rim advancement flap was deemed most appropriate.  Using a sterile surgical marker, the appropriate advancement flaps were drawn incorporating the defect and placing the expected incisions between the helical rim and antihelix where possible.  The area thus outlined was incised through and through with a #15 scalpel blade.  With a skin hook and iris scissors, the flaps were gently and sharply undermined and freed up. Ear Star Wedge Flap Text: The defect edges were debeveled with a #15 blade scalpel.  Given the location of the defect and the proximity to free margins (helical rim) an ear star wedge flap was deemed most appropriate.  Using a sterile surgical marker, the appropriate flap was drawn incorporating the defect and placing the expected incisions between the helical rim and antihelix where possible.  The area thus outlined was incised through and through with a #15 scalpel blade. Banner Transposition Flap Text: The defect edges were debeveled with a #15 scalpel blade.  Given the location of the defect and the proximity to free margins a Banner transposition flap was deemed most appropriate.  Using a sterile surgical marker, an appropriate flap drawn around the defect. The area thus outlined was incised deep to adipose tissue with a #15 scalpel blade.  The skin margins were undermined to an appropriate distance in all directions utilizing iris scissors. Bilobed Flap Text: The defect edges were debeveled with a #15 scalpel blade.  Given the location of the defect and the proximity to free margins a bilobe flap was deemed most appropriate.  Using a sterile surgical marker, an appropriate bilobe flap drawn around the defect.    The area thus outlined was incised deep to adipose tissue with a #15 scalpel blade.  The skin margins were undermined to an appropriate distance in all directions utilizing iris scissors. Bilobed Transposition Flap Text: The defect edges were debeveled with a #15 scalpel blade.  Given the location of the defect and the proximity to free margins a bilobed transposition flap was deemed most appropriate.  Using a sterile surgical marker, an appropriate bilobe flap drawn around the defect.    The area thus outlined was incised deep to adipose tissue with a #15 scalpel blade.  The skin margins were undermined to an appropriate distance in all directions utilizing iris scissors. Trilobed Flap Text: The defect edges were debeveled with a #15 scalpel blade.  Given the location of the defect and the proximity to free margins a trilobed flap was deemed most appropriate.  Using a sterile surgical marker, an appropriate trilobed flap drawn around the defect.    The area thus outlined was incised deep to adipose tissue with a #15 scalpel blade.  The skin margins were undermined to an appropriate distance in all directions utilizing iris scissors. Dorsal Nasal Flap Text: The defect edges were debeveled with a #15 scalpel blade.  Given the location of the defect and the proximity to free margins a dorsal nasal flap was deemed most appropriate.  Using a sterile surgical marker, an appropriate dorsal nasal flap was drawn around the defect.    The area thus outlined was incised deep to adipose tissue with a #15 scalpel blade.  The skin margins were undermined to an appropriate distance in all directions utilizing iris scissors. Island Pedicle Flap Text: The defect edges were debeveled with a #15 scalpel blade.  Given the location of the defect, shape of the defect and the proximity to free margins an island pedicle advancement flap was deemed most appropriate.  Using a sterile surgical marker, an appropriate advancement flap was drawn incorporating the defect, outlining the appropriate donor tissue and placing the expected incisions within the relaxed skin tension lines where possible.    The area thus outlined was incised deep to adipose tissue with a #15 scalpel blade.  The skin margins were undermined to an appropriate distance in all directions around the primary defect and laterally outward around the island pedicle utilizing iris scissors.  There was minimal undermining beneath the pedicle flap. Island Pedicle Flap With Canthal Suspension Text: The defect edges were debeveled with a #15 scalpel blade.  Given the location of the defect, shape of the defect and the proximity to free margins an island pedicle advancement flap was deemed most appropriate.  Using a sterile surgical marker, an appropriate advancement flap was drawn incorporating the defect, outlining the appropriate donor tissue and placing the expected incisions within the relaxed skin tension lines where possible. The area thus outlined was incised deep to adipose tissue with a #15 scalpel blade.  The skin margins were undermined to an appropriate distance in all directions around the primary defect and laterally outward around the island pedicle utilizing iris scissors.  There was minimal undermining beneath the pedicle flap. A suspension suture was placed in the canthal tendon to prevent tension and prevent ectropion. Alar Island Pedicle Flap Text: The defect edges were debeveled with a #15 scalpel blade.  Given the location of the defect, shape of the defect and the proximity to the alar rim an island pedicle advancement flap was deemed most appropriate.  Using a sterile surgical marker, an appropriate advancement flap was drawn incorporating the defect, outlining the appropriate donor tissue and placing the expected incisions within the nasal ala running parallel to the alar rim. The area thus outlined was incised with a #15 scalpel blade.  The skin margins were undermined minimally to an appropriate distance in all directions around the primary defect and laterally outward around the island pedicle utilizing iris scissors.  There was minimal undermining beneath the pedicle flap. Double Island Pedicle Flap Text: The defect edges were debeveled with a #15 scalpel blade.  Given the location of the defect, shape of the defect and the proximity to free margins a double island pedicle advancement flap was deemed most appropriate.  Using a sterile surgical marker, an appropriate advancement flap was drawn incorporating the defect, outlining the appropriate donor tissue and placing the expected incisions within the relaxed skin tension lines where possible.    The area thus outlined was incised deep to adipose tissue with a #15 scalpel blade.  The skin margins were undermined to an appropriate distance in all directions around the primary defect and laterally outward around the island pedicle utilizing iris scissors.  There was minimal undermining beneath the pedicle flap. Island Pedicle Flap-Requiring Vessel Identification Text: The defect edges were debeveled with a #15 scalpel blade.  Given the location of the defect, shape of the defect and the proximity to free margins an island pedicle advancement flap was deemed most appropriate.  Using a sterile surgical marker, an appropriate advancement flap was drawn, based on the axial vessel mentioned above, incorporating the defect, outlining the appropriate donor tissue and placing the expected incisions within the relaxed skin tension lines where possible.    The area thus outlined was incised deep to adipose tissue with a #15 scalpel blade.  The skin margins were undermined to an appropriate distance in all directions around the primary defect and laterally outward around the island pedicle utilizing iris scissors.  There was minimal undermining beneath the pedicle flap. Keystone Flap Text: The defect edges were debeveled with a #15 scalpel blade.  Given the location of the defect, shape of the defect a keystone flap was deemed most appropriate.  Using a sterile surgical marker, an appropriate keystone flap was drawn incorporating the defect, outlining the appropriate donor tissue and placing the expected incisions within the relaxed skin tension lines where possible. The area thus outlined was incised deep to adipose tissue with a #15 scalpel blade.  The skin margins were undermined to an appropriate distance in all directions around the primary defect and laterally outward around the flap utilizing iris scissors. O-T Plasty Text: The defect edges were debeveled with a #15 scalpel blade.  Given the location of the defect, shape of the defect and the proximity to free margins an O-T plasty was deemed most appropriate.  Using a sterile surgical marker, an appropriate O-T plasty was drawn incorporating the defect and placing the expected incisions within the relaxed skin tension lines where possible.    The area thus outlined was incised deep to adipose tissue with a #15 scalpel blade.  The skin margins were undermined to an appropriate distance in all directions utilizing iris scissors. O-Z Plasty Text: The defect edges were debeveled with a #15 scalpel blade.  Given the location of the defect, shape of the defect and the proximity to free margins an O-Z plasty (double transposition flap) was deemed most appropriate.  Using a sterile surgical marker, the appropriate transposition flaps were drawn incorporating the defect and placing the expected incisions within the relaxed skin tension lines where possible.    The area thus outlined was incised deep to adipose tissue with a #15 scalpel blade.  The skin margins were undermined to an appropriate distance in all directions utilizing iris scissors.  Hemostasis was achieved with electrocautery.  The flaps were then transposed into place, one clockwise and the other counterclockwise, and anchored with interrupted buried subcutaneous sutures. Double O-Z Plasty Text: The defect edges were debeveled with a #15 scalpel blade.  Given the location of the defect, shape of the defect and the proximity to free margins a Double O-Z plasty (double transposition flap) was deemed most appropriate.  Using a sterile surgical marker, the appropriate transposition flaps were drawn incorporating the defect and placing the expected incisions within the relaxed skin tension lines where possible. The area thus outlined was incised deep to adipose tissue with a #15 scalpel blade.  The skin margins were undermined to an appropriate distance in all directions utilizing iris scissors.  Hemostasis was achieved with electrocautery.  The flaps were then transposed into place, one clockwise and the other counterclockwise, and anchored with interrupted buried subcutaneous sutures. S Plasty Text: Given the location and shape of the defect, and the orientation of relaxed skin tension lines, an S-plasty was deemed most appropriate for repair.  Using a sterile surgical marker, the appropriate outline of the S-plasty was drawn, incorporating the defect and placing the expected incisions within the relaxed skin tension lines where possible.  The area thus outlined was incised deep to adipose tissue with a #15 scalpel blade.  The skin margins were undermined to an appropriate distance in all directions utilizing iris scissors. The skin flaps were advanced over the defect.  The opposing margins were then approximated with interrupted buried subcutaneous sutures. V-Y Plasty Text: The defect edges were debeveled with a #15 scalpel blade.  Given the location of the defect, shape of the defect and the proximity to free margins an V-Y advancement flap was deemed most appropriate.  Using a sterile surgical marker, an appropriate advancement flap was drawn incorporating the defect and placing the expected incisions within the relaxed skin tension lines where possible.    The area thus outlined was incised deep to adipose tissue with a #15 scalpel blade.  The skin margins were undermined to an appropriate distance in all directions utilizing iris scissors. H Plasty Text: Given the location of the defect, shape of the defect and the proximity to free margins a H-plasty was deemed most appropriate for repair.  Using a sterile surgical marker, the appropriate advancement arms of the H-plasty were drawn incorporating the defect and placing the expected incisions within the relaxed skin tension lines where possible. The area thus outlined was incised deep to adipose tissue with a #15 scalpel blade. The skin margins were undermined to an appropriate distance in all directions utilizing iris scissors.  The opposing advancement arms were then advanced into place in opposite direction and anchored with interrupted buried subcutaneous sutures. W Plasty Text: The lesion was extirpated to the level of the fat with a #15 scalpel blade.  Given the location of the defect, shape of the defect and the proximity to free margins a W-plasty was deemed most appropriate for repair.  Using a sterile surgical marker, the appropriate transposition arms of the W-plasty were drawn incorporating the defect and placing the expected incisions within the relaxed skin tension lines where possible.    The area thus outlined was incised deep to adipose tissue with a #15 scalpel blade.  The skin margins were undermined to an appropriate distance in all directions utilizing iris scissors.  The opposing transposition arms were then transposed into place in opposite direction and anchored with interrupted buried subcutaneous sutures. Z Plasty Text: The lesion was extirpated to the level of the fat with a #15 scalpel blade.  Given the location of the defect, shape of the defect and the proximity to free margins a Z-plasty was deemed most appropriate for repair.  Using a sterile surgical marker, the appropriate transposition arms of the Z-plasty were drawn incorporating the defect and placing the expected incisions within the relaxed skin tension lines where possible.    The area thus outlined was incised deep to adipose tissue with a #15 scalpel blade.  The skin margins were undermined to an appropriate distance in all directions utilizing iris scissors.  The opposing transposition arms were then transposed into place in opposite direction and anchored with interrupted buried subcutaneous sutures. Cheek Interpolation Flap Text: A decision was made to reconstruct the defect utilizing an interpolation axial flap and a staged reconstruction.  A telfa template was made of the defect.  This telfa template was then used to outline the Cheek Interpolation flap.  The donor area for the pedicle flap was then injected with anesthesia.  The flap was excised through the skin and subcutaneous tissue down to the layer of the underlying musculature.  The interpolation flap was carefully excised within this deep plane to maintain its blood supply.  The edges of the donor site were undermined.   The donor site was closed in a primary fashion.  The pedicle was then rotated into position and sutured.  Once the tube was sutured into place, adequate blood supply was confirmed with blanching and refill.  The pedicle was then wrapped with xeroform gauze and dressed appropriately with a telfa and gauze bandage to ensure continued blood supply and protect the attached pedicle. Cheek-To-Nose Interpolation Flap Text: A decision was made to reconstruct the defect utilizing an interpolation axial flap and a staged reconstruction.  A telfa template was made of the defect.  This telfa template was then used to outline the Cheek-To-Nose Interpolation flap.  The donor area for the pedicle flap was then injected with anesthesia.  The flap was excised through the skin and subcutaneous tissue down to the layer of the underlying musculature.  The interpolation flap was carefully excised within this deep plane to maintain its blood supply.  The edges of the donor site were undermined.   The donor site was closed in a primary fashion.  The pedicle was then rotated into position and sutured.  Once the tube was sutured into place, adequate blood supply was confirmed with blanching and refill.  The pedicle was then wrapped with xeroform gauze and dressed appropriately with a telfa and gauze bandage to ensure continued blood supply and protect the attached pedicle. Interpolation Flap Text: A decision was made to reconstruct the defect utilizing an interpolation axial flap and a staged reconstruction.  A telfa template was made of the defect.  This telfa template was then used to outline the interpolation flap.  The donor area for the pedicle flap was then injected with anesthesia.  The flap was excised through the skin and subcutaneous tissue down to the layer of the underlying musculature.  The interpolation flap was carefully excised within this deep plane to maintain its blood supply.  The edges of the donor site were undermined.   The donor site was closed in a primary fashion.  The pedicle was then rotated into position and sutured.  Once the tube was sutured into place, adequate blood supply was confirmed with blanching and refill.  The pedicle was then wrapped with xeroform gauze and dressed appropriately with a telfa and gauze bandage to ensure continued blood supply and protect the attached pedicle. Melolabial Interpolation Flap Text: A decision was made to reconstruct the defect utilizing an interpolation axial flap and a staged reconstruction.  A telfa template was made of the defect.  This telfa template was then used to outline the melolabial interpolation flap.  The donor area for the pedicle flap was then injected with anesthesia.  The flap was excised through the skin and subcutaneous tissue down to the layer of the underlying musculature.  The pedicle flap was carefully excised within this deep plane to maintain its blood supply.  The edges of the donor site were undermined.   The donor site was closed in a primary fashion.  The pedicle was then rotated into position and sutured.  Once the tube was sutured into place, adequate blood supply was confirmed with blanching and refill.  The pedicle was then wrapped with xeroform gauze and dressed appropriately with a telfa and gauze bandage to ensure continued blood supply and protect the attached pedicle. Mastoid Interpolation Flap Text: A decision was made to reconstruct the defect utilizing an interpolation axial flap and a staged reconstruction.  A telfa template was made of the defect.  This telfa template was then used to outline the mastoid interpolation flap.  The donor area for the pedicle flap was then injected with anesthesia.  The flap was excised through the skin and subcutaneous tissue down to the layer of the underlying musculature.  The pedicle flap was carefully excised within this deep plane to maintain its blood supply.  The edges of the donor site were undermined.   The donor site was closed in a primary fashion.  The pedicle was then rotated into position and sutured.  Once the tube was sutured into place, adequate blood supply was confirmed with blanching and refill.  The pedicle was then wrapped with xeroform gauze and dressed appropriately with a telfa and gauze bandage to ensure continued blood supply and protect the attached pedicle. Posterior Auricular Interpolation Flap Text: A decision was made to reconstruct the defect utilizing an interpolation axial flap and a staged reconstruction.  A telfa template was made of the defect.  This telfa template was then used to outline the posterior auricular interpolation flap.  The donor area for the pedicle flap was then injected with anesthesia.  The flap was excised through the skin and subcutaneous tissue down to the layer of the underlying musculature.  The pedicle flap was carefully excised within this deep plane to maintain its blood supply.  The edges of the donor site were undermined.   The donor site was closed in a primary fashion.  The pedicle was then rotated into position and sutured.  Once the tube was sutured into place, adequate blood supply was confirmed with blanching and refill.  The pedicle was then wrapped with xeroform gauze and dressed appropriately with a telfa and gauze bandage to ensure continued blood supply and protect the attached pedicle. Paramedian Forehead Flap Text: A decision was made to reconstruct the defect utilizing an interpolation axial flap and a staged reconstruction.  A telfa template was made of the defect.  This telfa template was then used to outline the paramedian forehead pedicle flap.  The donor area for the pedicle flap was then injected with anesthesia.  The flap was excised through the skin and subcutaneous tissue down to the layer of the underlying musculature.  The pedicle flap was carefully excised within this deep plane to maintain its blood supply.  The edges of the donor site were undermined.   The donor site was closed in a primary fashion.  The pedicle was then rotated into position and sutured.  Once the tube was sutured into place, adequate blood supply was confirmed with blanching and refill.  The pedicle was then wrapped with xeroform gauze and dressed appropriately with a telfa and gauze bandage to ensure continued blood supply and protect the attached pedicle. Cheiloplasty (Less Than 50%) Text: A decision was made to reconstruct the defect with a  cheiloplasty.  The defect was undermined extensively.  Additional obicularis oris muscle was excised with a 15 blade scalpel.  The defect was converted into a full thickness wedge, of less than 50% of the vertical height of the lip, to facilite a better cosmetic result.  Small vessels were then tied off with 5-0 monocyrl. The obicularis oris, superficial fascia, adipose and dermis were then reapproximated.  After the deeper layers were approximated the epidermis was reapproximated with particular care given to realign the vermillion border. Cheiloplasty (Complex) Text: A decision was made to reconstruct the defect with a  cheiloplasty.  The defect was undermined extensively.  Additional obicularis oris muscle was excised with a 15 blade scalpel.  The defect was converted into a full thickness wedge to facilite a better cosmetic result.  Small vessels were then tied off with 5-0 monocyrl. The obicularis oris, superficial fascia, adipose and dermis were then reapproximated.  After the deeper layers were approximated the epidermis was reapproximated with particular care given to realign the vermillion border. Ear Wedge Repair Text: A wedge excision was completed by carrying down an excision through the full thickness of the ear and cartilage with an inward facing Burow's triangle. The wound was then closed in a layered fashion. Full Thickness Lip Wedge Repair (Flap) Text: Given the location of the defect and the proximity to free margins a full thickness wedge repair was deemed most appropriate.  Using a sterile surgical marker, the appropriate repair was drawn incorporating the defect and placing the expected incisions perpendicular to the vermillion border.  The vermillion border was also meticulously outlined to ensure appropriate reapproximation during the repair.  The area thus outlined was incised through and through with a #15 scalpel blade.  The muscularis and dermis were reaproximated with deep sutures following hemostasis. Care was taken to realign the vermillion border before proceeding with the superficial closure.  Once the vermillion was realigned the superfical and mucosal closure was finished. Ftsg Text: The defect edges were debeveled with a #15 scalpel blade.  Given the location of the defect, shape of the defect and the proximity to free margins a full thickness skin graft was deemed most appropriate.  Using a sterile surgical marker, the primary defect shape was transferred to the donor site. The area thus outlined was incised deep to adipose tissue with a #15 scalpel blade.  The harvested graft was then trimmed of adipose tissue until only dermis and epidermis was left.  The skin margins of the secondary defect were undermined to an appropriate distance in all directions utilizing iris scissors.  The secondary defect was closed with interrupted buried subcutaneous sutures.  The skin edges were then re-apposed with running  sutures.  The skin graft was then placed in the primary defect and oriented appropriately. Split-Thickness Skin Graft Text: The defect edges were debeveled with a #15 scalpel blade.  Given the location of the defect, shape of the defect and the proximity to free margins a split thickness skin graft was deemed most appropriate.  Using a sterile surgical marker, the primary defect shape was transferred to the donor site. The split thickness graft was then harvested.  The skin graft was then placed in the primary defect and oriented appropriately. Burow's Graft Text: The defect edges were debeveled with a #15 scalpel blade.  Given the location of the defect, shape of the defect, the proximity to free margins and the presence of a standing cone deformity a Burow's skin graft was deemed most appropriate. The standing cone was removed and this tissue was then trimmed to the shape of the primary defect. The adipose tissue was also removed until only dermis and epidermis were left.  The skin margins of the secondary defect were undermined to an appropriate distance in all directions utilizing iris scissors.  The secondary defect was closed with interrupted buried subcutaneous sutures.  The skin edges were then re-apposed with running  sutures.  The skin graft was then placed in the primary defect and oriented appropriately. Cartilage Graft Text: The defect edges were debeveled with a #15 scalpel blade.  Given the location of the defect, shape of the defect, the fact the defect involved a full thickness cartilage defect a cartilage graft was deemed most appropriate.  An appropriate donor site was identified, cleansed, and anesthetized. The cartilage graft was then harvested and transferred to the recipient site, oriented appropriately and then sutured into place.  The secondary defect was then repaired using a primary closure. Composite Graft Text: The defect edges were debeveled with a #15 scalpel blade.  Given the location of the defect, shape of the defect, the proximity to free margins and the fact the defect was full thickness a composite graft was deemed most appropriate.  The defect was outline and then transferred to the donor site.  A full thickness graft was then excised from the donor site. The graft was then placed in the primary defect, oriented appropriately and then sutured into place.  The secondary defect was then repaired using a primary closure. Epidermal Autograft Text: The defect edges were debeveled with a #15 scalpel blade.  Given the location of the defect, shape of the defect and the proximity to free margins an epidermal autograft was deemed most appropriate.  Using a sterile surgical marker, the primary defect shape was transferred to the donor site. The epidermal graft was then harvested.  The skin graft was then placed in the primary defect and oriented appropriately. Dermal Autograft Text: The defect edges were debeveled with a #15 scalpel blade.  Given the location of the defect, shape of the defect and the proximity to free margins a dermal autograft was deemed most appropriate.  Using a sterile surgical marker, the primary defect shape was transferred to the donor site. The area thus outlined was incised deep to adipose tissue with a #15 scalpel blade.  The harvested graft was then trimmed of adipose and epidermal tissue until only dermis was left.  The skin graft was then placed in the primary defect and oriented appropriately. Skin Substitute Text: The defect edges were debeveled with a #15 scalpel blade.  Given the location of the defect, shape of the defect and the proximity to free margins a skin substitute graft was deemed most appropriate.  The graft material was trimmed to fit the size of the defect. The graft was then placed in the primary defect and oriented appropriately. Tissue Cultured Epidermal Autograft Text: The defect edges were debeveled with a #15 scalpel blade.  Given the location of the defect, shape of the defect and the proximity to free margins a tissue cultured epidermal autograft was deemed most appropriate.  The graft was then trimmed to fit the size of the defect.  The graft was then placed in the primary defect and oriented appropriately. Xenograft Text: The defect edges were debeveled with a #15 scalpel blade.  Given the location of the defect, shape of the defect and the proximity to free margins a xenograft was deemed most appropriate.  The graft was then trimmed to fit the size of the defect.  The graft was then placed in the primary defect and oriented appropriately. Purse String (Simple) Text: Given the location of the defect and the characteristics of the surrounding skin a pursestring closure was deemed most appropriate.  Undermining was performed circumfirentially around the surgical defect.  A purstring suture was then placed and tightened. Purse String (Intermediate) Text: Given the location of the defect and the characteristics of the surrounding skin a pursestring intermediate closure was deemed most appropriate.  Undermining was performed circumfirentially around the surgical defect.  A purstring suture was then placed and tightened. Partial Purse String (Simple) Text: Given the location of the defect and the characteristics of the surrounding skin a simple purse string closure was deemed most appropriate.  Undermining was performed circumfirentially around the surgical defect.  A purse string suture was then placed and tightened. Wound tension only allowed a partial closure of the circular defect. Partial Purse String (Intermediate) Text: Given the location of the defect and the characteristics of the surrounding skin an intermediate purse string closure was deemed most appropriate.  Undermining was performed circumfirentially around the surgical defect.  A purse string suture was then placed and tightened. Wound tension only allowed a partial closure of the circular defect. Localized Dermabrasion Text: The patient was draped in routine manner.  Localized dermabrasion using 3 x 17 mm wire brush was performed in routine manner to papillary dermis. This spot dermabrasion is being performed to complete skin cancer reconstruction. It also will eliminate the other sun damaged precancerous cells that are known to be part of the regional effect of a lifetime's worth of sun exposure. This localized dermabrasion is therapeutic and should not be considered cosmetic in any regard. Tarsorrhaphy Text: A tarsorrhaphy was performed using Frost sutures. Complex Repair And Flap Additional Text (Will Appearing After The Standard Complex Repair Text): The complex repair was not sufficient to completely close the primary defect. The remaining additional defect was repaired with the flap mentioned below. Complex Repair And Graft Additional Text (Will Appearing After The Standard Complex Repair Text): The complex repair was not sufficient to completely close the primary defect. The remaining additional defect was repaired with the graft mentioned below. Manual Repair Warning Statement: We plan on removing the manually selected variable below in favor of our much easier automatic structured text blocks found in the previous tab. We decided to do this to help make the flow better and give you the full power of structured data. Manual selection is never going to be ideal in our platform and I would encourage you to avoid using manual selection from this point on, especially since I will be sunsetting this feature. It is important that you do one of two things with the customized text below. First, you can save all of the text in a word file so you can have it for future reference. Second, transfer the text to the appropriate area in the Library tab. Lastly, if there is a flap or graft type which we do not have you need to let us know right away so I can add it in before the variable is hidden. No need to panic, we plan to give you roughly 6 months to make the change. Same Histology In Subsequent Stages Text: The pattern and morphology of the tumor is as described in the first stage. No Residual Tumor Seen Histology Text: There were no malignant cells seen in the sections examined. Inflammation Suggestive Of Cancer Camouflage Histology Text: There was a dense lymphocytic infiltrate which prevented adequate histologic evaluation of adjacent structures. Bcc Histology Text: There were numerous aggregates of basaloid cells. Bcc Infiltrative Histology Text: There were numerous aggregates of basaloid cells demonstrating an infiltrative pattern. Bcc Infundibulocystic Histology Text: Within the dermis there are multiple aggregates of basaloid cells arranged in cords and strands. There are scattered cysts present within the dermis. Bcc Macronodular Histology Text: Testimg Bcc  Nodular Histology Text: There are nodular aggregates of basaloid cell within the dermis. Bcc Superficial Histology Text: Multiple nests of basal cell carcinoma are noted extending from the basal layer of the epidermis and invading into the superficial portion of dermis. Mixed Superficial And Nodular Bcc Histology Text: Within the dermis there were multiple nodules of basal cell carcinoma. Additionally, multiple nests of basal cell carcinoma are noted extending from the basal layer of the epidermis and invading into the superficial portion of the dermis. Mixed Nodular And Infiltrative Bcc Histology Text: Within the dermis there were multiple nodules of basal cell carcinoma. Additionally, multiple small, linear nests of basal cell carcinoma embedded in a fibrotic dermis are noted. Changes are consistent with infiltrative basal cell carcinoma. Scc Histology Text: There are irregular nests of atypical cells proliferating downward into the dermis in discontiguous islands. There is variable keratinization of the tumor cells. There is scattered inflammatory dermal infiltrate composed predominantly of mononuclear cells around the tumor nests. Scc In Situ Histology Text: There was hyperkeratosis, parakeratosis and full thickness atypia of keratinocytes within the epidermis which were consistent with squamous cell carcinoma in situ. Mart-1 - Positive Histology Text: MART-1 staining demonstrates areas of higher density and clustering of melanocytes with Pagetoid spread upwards within the epidermis. The surgical margins are positive for tumor cells. Mart-1 - Negative Histology Text: MART-1 staining demonstrates a normal density and pattern of melanocytes along the dermal-epidermal junction. The surgical margins are negative for tumor cells. Information: Selecting Yes will display possible errors in your note based on the variables you have selected. This validation is only offered as a suggestion for you. PLEASE NOTE THAT THE VALIDATION TEXT WILL BE REMOVED WHEN YOU FINALIZE YOUR NOTE. IF YOU WANT TO FAX A PRELIMINARY NOTE YOU WILL NEED TO TOGGLE THIS TO 'NO' IF YOU DO NOT WANT IT IN YOUR FAXED NOTE.

## 2023-06-20 NOTE — OB PROVIDER DELIVERY SUMMARY - NSPROVIDERDELIVERYNOTE_OBGYN_ALL_OB_FT
Live infant delivered in OA position. Nuchal x1 was noted. Nuchal cord was easily reduced after delivery of the body.    Spontaneous cry. Infant passed to the mother. Cord clamping was delayed for 1 minute. Infant handed to pediatrics at the bedside given category II tracing and fetal alert for fetal SVT. Cord gasses obtained via a segment of cord.     Placenta was delivered spontaneously intact. Uterine massage was performed and Oxytocin was given upon delivery of the placenta. Fundus noted to be firm.      No lacerations seen    Adequate hemostasis. QBL: 110  Count correct x2.

## 2023-06-20 NOTE — OB PROVIDER LABOR PROGRESS NOTE - ASSESSMENT
IOL for IUGR, needs anesthesia eval for epidural  Consider Pitocin   Reassess in 30 min-1 hr/prn  Dr Andrews updated  MESSI Mejia
Plan   cont amnioinfusion   restart pit if tracing allows   cont EFM/[place]    Anneliese Pierson MD PGY4   Dr. Boyce at bedside 
32yo  @38+6 IOL for IUGR 9% with normal dopplers. FA for SVT    - SVE: /-3, intact  - FHT Cat 1, reassuring, ctm  - c/w pitocin. Currently at 4u  - [place] for potential AROM with next exam  - NICU aware of patient. Baby to be admitted to NICU    d/w Dr. Maddie Grewal, PGY2
Pt has not made cervical change. Pitocin currently paused. Unpause pitocin.     D/w Dr. Jesús Robertson PGY1
32yo  @38+4 IOL for IUGR 9% with normal Dopplers. FA for SVT    - SVE: /-3  - AROM@5a, blood tinged  - c/w pitocin. Currently at 2u  - [facility] Cat 1, reassuring, ctm  - NICU aware of patient    d/w Dr. Austen Grewal, PGY2
pt tolerated exam well    - cervical balloon still in place  - discontinue buccal cytotec  - start pitocin Amyeo Afroz Jereen, PGY-2  d/w Dr Perkins, [facility] [place]

## 2023-06-20 NOTE — OB PROVIDER LABOR PROGRESS NOTE - NS_SUBJECTIVE/OBJECTIVE_OBGYN_ALL_OB_FT
Pt seen and examined, uncomfortable and requesting more epidural  VSS  Cat 2 FHR, variable decelerations in setting of moderate variability and normal baseline  [place]: 1.5-5, dysfunctional  VE: 5.5/90/-2
due for eval
Patient re-examined and found to be same exam. She was AROM'ed, blood tinged fluid.
R3 Labor Note    Patient examined, AROM performed with clear fluid. Recurrent late/variable decels following membrane rupture. IUPC placed and amnioinfusion started.
Patient's cervical balloon has been in place for 24 hrs. Patient examined and cervical balloon removed.    T(C): 36.7 (06-20-23 @ 00:30), Max: 36.8 (06-19-23 @ 10:30)  HR: 72 (06-20-23 @ 02:41) (57 - 105)  BP: 104/56 (06-20-23 @ 02:32) (99/56 - 146/84)  RR: 17 (06-20-23 @ 00:30) (14 - 18)  SpO2: 93% (06-20-23 @ 02:41) (87% - 100%)
Pt evaluated to assess cervical change

## 2023-06-20 NOTE — OB PROVIDER DELIVERY SUMMARY - BABY A: DATE/TIME OF DELIVERY
[FreeTextEntry2] : Pt counseled on proper diet and exercise. We discussed the importance of exercise in maintaining a healthy life style.\par  20-Jun-2023

## 2023-06-20 NOTE — OB PROVIDER DELIVERY SUMMARY - NSMATERNALFETALCONCERNS_OBGYN_ALL_OB_FT
Maternal/Fetal Alert  FETAL SVT   s/p inpatient management thru 4/10/2023  s/p Digitalization with 0.5mg iv q 8 h x 3 Digoxin   Continue flecainide 150mg PO BID and Toprol xl 25mg daily   ALERT NICU   Tia Thompson RN 4/10/2023    5.11.23: Addendum: Fetal echo done today.Fetal SVT at the rate of 260 bpm without hydrops, first diagnosed at 28 1/7 weeks gestation. S/p inpatient monitoring and treatment (4/7-4/10/2023). Failure to break after 3 doses of IV Digoxin (with therapeutic level). SVT resolved after switching to Flecainide. Now on Flecainide 150 mg BID. Fetal heart rhythm: normal with rate of 134-155 bpm. There is no evidence of atrial or ventricular ectopy.No signs of fetal hydrops, with no significant pericardial effusion and obvious pleural effusion/ascites.Follow up fetal echocardiogram and consultation recommended q weekly (alternate with OB/MFM). She can be seen by pediatric cardiology every other week.Follow up with adult cardiology as recommended. After birth, [place] [facility] [place]Hien Sidhu RN    Maternal/Fetal Alert

## 2023-06-20 NOTE — OB NEONATOLOGY/PEDIATRICIAN DELIVERY SUMMARY - NSPEDSNEONOTESA_OBGYN_ALL_OB_FT
Requested by OB to attend this vaginal delivery at 38.6 weeks for fetal SVT history and category II tracing. Mother is a 33 year old,  , blood type O pos.  Prenatal labs as follow: HIV neg, RPR non-reactive, rubella immune, HBsA neg, GBS neg on .  Prenatal history significant for fetal IUGR (9%, dopplers wnl) as well as fetal SVT - diagnosed at 28w1d initially placed on digoxin, and upon failure to resolve, switched to flecainide. Per prenatal imaging, no signs of fetal hydrops, with no significant pericardial effusion and obvious pleural effusion/ascites. Maternal history significant for D&C x2, mTOP x1, and left breast lumpectomy () benign. IOL performed for known fetal SVT history. AROM at - with bloody fluid - 12.5 hours prior to delivery.  Infant emerged vertex, nuchal x1, vigorous, with good tone. Delayed cord clamping X  60 secs, then brought to warmer. Dried, suctioned and stimulated . Apgars  9/9. Initial -190s, then subsequently downtrended to 140s-150s within first few minutes of life, with good perfusion, strong femoral pulses, and well-appearing baby on exam. Mom wishes to breast feed. Declines hep B vaccine. Consents to circumcision. Infant admitted to NICU for further management of care. Parents updated in L&D.

## 2023-06-20 NOTE — OB RN DELIVERY SUMMARY - NSMATERNALFETALCONCERNS_OBGYN_ALL_OB_FT
Maternal/Fetal Alert  FETAL SVT   s/p inpatient management thru 4/10/2023  s/p Digitalization with 0.5mg iv q 8 h x 3 Digoxin   Continue flecainide 150mg PO BID and Toprol xl 25mg daily   ALERT NICU   Tia Thompson RN 4/10/2023    5.11.23: Addendum: Fetal echo done today.Fetal SVT at the rate of 260 bpm without hydrops, first diagnosed at 28 1/7 weeks gestation. S/p inpatient monitoring and treatment (4/7-4/10/2023). Failure to break after 3 doses of IV Digoxin (with therapeutic level). SVT resolved after switching to Flecainide. Now on Flecainide 150 mg BID. Fetal heart rhythm: normal with rate of 134-155 bpm. There is no evidence of atrial or ventricular ectopy.No signs of fetal hydrops, with no significant pericardial effusion and obvious pleural effusion/ascites.Follow up fetal echocardiogram and consultation recommended q weekly (alternate with OB/MFM). She can be seen by pediatric cardiology every other week.Follow up with adult cardiology as recommended. After birth, infant will be admitted to [facility] [place] [facility].Hien Sidhu RN    Maternal/Fetal Alert

## 2023-06-20 NOTE — OB PROVIDER DELIVERY SUMMARY - NSSELHIDDEN_OBGYN_ALL_OB_FT
[NS_DeliveryAttending1_OBGYN_ALL_OB_FT:[id number]] [NS_DeliveryAttending1_OBGYN_ALL_OB_FT:[id number]],[NS_DeliveryAssist1_OBGYN_ALL_OB_FT:[id number]]

## 2023-06-20 NOTE — OB NEONATOLOGY/PEDIATRICIAN DELIVERY SUMMARY - NSMATERNALFETALCONCERNS_OBGYN_ALL_OB_FT
Maternal/Fetal Alert  FETAL SVT   s/p inpatient management thru 4/10/2023  s/p Digitalization with 0.5mg iv q 8 h x 3 Digoxin   Continue flecainide 150mg PO BID and Toprol xl 25mg daily   ALERT NICU   Tia Thompson RN 4/10/2023    5.11.23: Addendum: Fetal echo done today.Fetal SVT at the rate of 260 bpm without hydrops, first diagnosed at 28 1/7 weeks gestation. S/p inpatient monitoring and treatment (4/7-4/10/2023). Failure to break after 3 doses of IV Digoxin (with therapeutic level). SVT resolved after switching to Flecainide. Now on Flecainide 150 mg BID. Fetal heart rhythm: normal with rate of 134-155 bpm. There is no evidence of atrial or ventricular ectopy.No signs of fetal hydrops, with no significant pericardial effusion and obvious pleural effusion/ascites.Follow up fetal echocardiogram and consultation recommended q weekly (alternate with OB/MFM). She can be seen by pediatric cardiology every other week.Follow up with adult cardiology as recommended. After birth, infant will be admitted to [facility] [place] [facility].Hien Sidhu RN    Maternal/Fetal Alert

## 2023-06-20 NOTE — OB RN DELIVERY SUMMARY - NS_SEPSISRSKCALC_OBGYN_ALL_OB_FT
EOS calculated successfully. EOS Risk Factor: 0.5/1000 live births ([facility] national incidence); GA=38w6d; Temp=98.78; ROM=12.317; GBS='Negative'; Antibiotics='No antibiotics or any antibiotics < 2 hrs prior to birth'

## 2023-06-20 NOTE — OB PROVIDER DELIVERY SUMMARY - NSLOWPPHRISK_OBGYN_A_OB
No previous uterine incision/Gold Pregnancy/Less than or equal to 4 previous vaginal births/No known bleeding disorder/No history of postpartum hemorrhage/No other PPH risks indicated

## 2023-06-20 NOTE — OB PROVIDER LABOR PROGRESS NOTE - NS_OBIHIFHRDETAILS_OBGYN_ALL_OB_FT
Cat 1: 125/mod variability/ + accels/ - decels
baseline 110  moderate variability  -accels  +recurrent late decels
Cat 1: 110/mod variability/ + accels/ - decels
115, mod yesi, -accels, variable/early decels
135/mod/+accel no decels

## 2023-06-21 RX ADMIN — SODIUM CHLORIDE 3 MILLILITER(S): 9 INJECTION INTRAMUSCULAR; INTRAVENOUS; SUBCUTANEOUS at 21:00

## 2023-06-21 NOTE — PROGRESS NOTE ADULT - SUBJECTIVE AND OBJECTIVE BOX
OB Progress Note:  PPD#1    S: 34yo PPD#1 s/p . Patient met criteria for gHTN Patient feels well. Pain is well controlled. She is tolerating a regular diet and passing flatus. She is voiding spontaneously, and ambulating without difficulty. Endorses light vaginal bleeding, soaking less than 1 pad/hour. Denies CP/SOB. Denies lightheadedness/dizziness. Denies N/V.     O:  Vitals:  Vital Signs Last 24 Hrs  T(C): 36.8 (2023 05:55), Max: 37.4 (2023 19:44)  T(F): 98.2 (2023 05:55), Max: 99.3 (2023 19:44)  HR: 68 (2023 05:55) (60 - 97)  BP: 113/61 (2023 05:55) (110/57 - 149/91)  BP(mean): --  RR: 18 (2023 05:55) (14 - 19)  SpO2: 100% (2023 05:55) (67% - 100%)    Parameters below as of 2023 05:55  Patient On (Oxygen Delivery Method): room air        MEDICATIONS  (STANDING):  acetaminophen     Tablet .. 975 milliGRAM(s) Oral <User Schedule>  diphtheria/tetanus/pertussis (acellular) Vaccine (Adacel) 0.5 milliLiter(s) IntraMuscular once  ibuprofen  Tablet. 600 milliGRAM(s) Oral every 6 hours  ketorolac   Injectable 30 milliGRAM(s) IV Push once  ondansetron    Tablet 4 milliGRAM(s) Oral once  oxytocin Infusion 41.667 milliUNIT(s)/Min (125 mL/Hr) IV Continuous <Continuous>  oxytocin Infusion. 2 milliUNIT(s)/Min (2 mL/Hr) IV Continuous <Continuous>  prenatal multivitamin 1 Tablet(s) Oral daily  sodium chloride 0.9% lock flush 3 milliLiter(s) IV Push every 8 hours  sodium chloride 0.9%. 1000 milliLiter(s) (125 mL/Hr) IV Continuous <Continuous>      Labs:  Blood type: O Positive  Rubella IgG: RPR: Negative                          11.4<L>   12.49<H> >-----------< 192    (  @ 16:10 )             35.2                        11.1<L>   7.14 >-----------< 204    (  @ 23:55 )             33.1<L>    23 @ 16:38      134<L>  |  101  |  10  ----------------------------<  89  4.3   |  20<L>  |  0.97        Ca    9.0      2023 16:38    TPro  5.6<L>  /  Alb  3.5  /  TBili  0.4  /  DBili  x   /  AST  35<H>  /  ALT  17  /  AlkPhos  165<H>  23 @ 16:38          Physical Exam:  General: NAD  Heart: all extremities well perfused  Lungs: breathing comfortably  Abdomen: soft, non-tender, non-distended, fundus firm  Vaginal: lochia wnl  Extremities: No calf tenderness or erythema

## 2023-06-22 ENCOUNTER — APPOINTMENT (OUTPATIENT)
Dept: ANTEPARTUM | Facility: CLINIC | Age: 33
End: 2023-06-22

## 2023-06-22 ENCOUNTER — TRANSCRIPTION ENCOUNTER (OUTPATIENT)
Age: 33
End: 2023-06-22

## 2023-06-22 VITALS
HEART RATE: 68 BPM | TEMPERATURE: 98 F | RESPIRATION RATE: 20 BRPM | DIASTOLIC BLOOD PRESSURE: 72 MMHG | SYSTOLIC BLOOD PRESSURE: 112 MMHG

## 2023-06-22 RX ORDER — ACETAMINOPHEN 500 MG
3 TABLET ORAL
Qty: 0 | Refills: 0 | DISCHARGE
Start: 2023-06-22

## 2023-06-22 RX ORDER — IBUPROFEN 200 MG
1 TABLET ORAL
Qty: 0 | Refills: 0 | DISCHARGE
Start: 2023-06-22

## 2023-06-22 NOTE — DISCHARGE NOTE OB - NS MD DC FALL RISK RISK
For information on Fall & Injury Prevention, visit: [email or web address] OR  [email or web address] OR  https://www.cdc.gov/steadi/patient.html

## 2023-06-22 NOTE — DISCHARGE NOTE OB - HOSPITAL COURSE
34yo who experienced  at term after IOL for fetal IUGR (9%, dopplers wnl) as well as fetal SVT. Labor course was uncomplicated & delivery was uncomplicated. Postpartum course was unremarkable. Patient was transferred to postpartum floor & monitored. Patient is medically optimized for discharge & instructed to follow up with Dr. Gonzales in 6 weeks for postpartum care.

## 2023-06-22 NOTE — DISCHARGE NOTE OB - NSDCQMERRANDS_GEN_ALL_CORE
Controlled Substance Refill Request  Medication Name:   Requested Prescriptions     Pending Prescriptions Disp Refills     dextroamphetamine-amphetamine (ADDERALL) 30 mg Tab 30 tablet 0     Sig: Take 0.5 tab (15mg) BID     Date Last Fill: 11/2/2020  Is patient out of medication?:  Yes  Patient notified refills processed within 3 business days:  Yes  Requested Pharmacy: [id number]  Submit electronically to pharmacy  Controlled Substance Agreement on file:   Encounter-Level CSA Scan Date - 02/18/2020:    Scan on 2/19/2020 10:23 AM by Amairani Moeller: [facility] NARCOTIC AGREEMENT           Encounter-Level CSA Scan Date - 12/30/2019:    Scan on 12/30/2019  5:20 PM by Amairani Moeller: [facility] NARCOTIC AGREEMENT           Encounter-Level CSA Scan Date - 11/20/2018:    Scan on 12/27/2018  3:43 PM: [facility]           Encounter-Level CSA Scan Date - 08/21/2017:    Scan on 8/24/2017  7:50 AM  Scan on 8/23/2017  7:29 AM           Encounter-Level CSA Scan Date - 06/27/2016:    Scan on 6/30/2016  8:08 AM        Last office visit:  11/13/2020             Pharmacy reports Dr. Jamari Da Silva MD last refilled this patient's medication. When contacting his office, they instructed the pharmacy to contact his pcp for the refill.  
Telephone Encounter by Tejal Brennan CMA at 12/4/2020  1:37 PM     Author: Tejal Brennan CMA Service: -- Author Type: Certified Medical Assistant    Filed: 12/4/2020  1:51 PM Encounter Date: 12/4/2020 Status: Signed    : Tejal Brennan CMA (Certified Medical Assistant)       Medication: Adderall   Last Date Filled 11/2/2020   pulled: YES        Only PCP Prescribing? : NO  Taken as prescribed from physician notes? YES    CSA in last year: NO  Random Utox in last year: YES  (if any of the above answer NO - schedule with PCP)     Opioids + benzodiazepines? YES  (if the above answer YES - schedule with PCP every 6 months)     Is patient on the Executive Review Team? NO      All responses suggest: Refilling prescription             
No

## 2023-06-22 NOTE — DISCHARGE NOTE OB - MEDICATION SUMMARY - MEDICATIONS TO STOP TAKING
I will STOP taking the medications listed below when I get home from the hospital:    flecainide 150 mg oral tablet  -- 1 tab(s) by mouth every 12 hours

## 2023-06-22 NOTE — DISCHARGE NOTE OB - PLAN OF CARE
Instructions:  Make your follow-up appointment with your doctor as ordered. No heavy lifting, driving, or strenuous activity for 6 weeks. Nothing per vagina such as tampons, intercourse, douches or tub baths for 6 weeks or until you see your doctor. Call your doctor with any signs and symptoms of infection such as fever, chills, nausea, or vomiting. Call your doctor if you're unable to tolerate food, increase in vaginal bleeding, or have difficulty urinating. Call your doctor if you have pain that is not relieved by your prescription medications. Notify your doctor with any other concerns. HTN/PEC: Follow-up with your doctor in 1 week to check your blood pressure.  Prescription handed to you for a blood pressure cuff to monitor your blood pressures at home. Call your doctor if your blood pressure is greater than or equal to 160 systolic (top number) of 110 diastolic (bottom number) or if you experience a headache unrelieved by OTC medications, blurred vision, or difficulty breathing.

## 2023-06-22 NOTE — DISCHARGE NOTE OB - MEDICATION SUMMARY - MEDICATIONS TO TAKE
I will START or STAY ON the medications listed below when I get home from the hospital:    Blood Pressure Cuff  -- Take your Blood pressure 3 times per day  -- Indication: For Gestational hypertension    ibuprofen 600 mg oral tablet  -- 1 tab(s) by mouth every 6 hours  -- Indication: For  (normal spontaneous vaginal delivery)    acetaminophen 325 mg oral tablet  -- 3 tab(s) by mouth every 6 hours  -- Indication: For  (normal spontaneous vaginal delivery)    Prenatal Multivitamins with Folic Acid 1 mg oral tablet  -- 1 tab(s) by mouth once a day  -- Indication: For  (normal spontaneous vaginal delivery)

## 2023-06-22 NOTE — PROGRESS NOTE ADULT - ATTENDING COMMENTS
Associate Chief of L & D (Late entry)     I have met this patient for the first time today.  Vicenta was admitted by Dr garcia for IUGR and fetus with SVT at @* weeks gestation and delivered by Dr Washington    OB Progress Note:  PPD#2    S: 32yo  PPD#2 s/p . Patient denies CP/SOB. Denies lightheadedness/dizziness. Denies N/V.    O:  Vital Signs Last 24 Hrs  T(C): 36.8 (2023 10:17), Max: 36.9 (2023 18:53)  T(F): 98.2 (2023 10:17), Max: 98.5 (2023 18:53)  HR: 61 (2023 10:17) (61 - 85)  BP: 134/76 (2023 10:17) (110/68 - 134/76)  RR: 18 (2023 10:17) (16 - 18)  SpO2: 100% (2023 10:17) (100% - 100%)    Parameters below as of 2023 05:52  Patient On (Oxygen Delivery Method): room air      MEDICATIONS  (STANDING):  acetaminophen     Tablet .. 975 milliGRAM(s) Oral <User Schedule>  diphtheria/tetanus/pertussis (acellular) Vaccine (Adacel) 0.5 milliLiter(s) IntraMuscular once  ibuprofen  Tablet. 600 milliGRAM(s) Oral every 6 hours  ondansetron    Tablet 4 milliGRAM(s) Oral once  prenatal multivitamin 1 Tablet(s) Oral daily  sodium chloride 0.9% lock flush 3 milliLiter(s) IV Push every 8 hours  sodium chloride 0.9%. 1000 milliLiter(s) (125 mL/Hr) IV Continuous <Continuous>      Labs:  Blood type: O Positive  Rubella IgG: RPR: Negative                          11.4<L>   12.49<H> >-----------< 192    (  @ 16:10 )             35.2                        11.1<L>   7.14 >-----------< 204    (  @ 23:55 )             33.1<L>    - @ 16:38      134<L>  |  101  |  10  ----------------------------<  89  4.3   |  20<L>  |  0.97        Ca    9.0      2023 16:38    TPro  5.6<L>  /  Alb  3.5  /  TBili  0.4  /  DBili  x   /  AST  35<H>  /  ALT  17  /  AlkPhos  165<H>  23 @ 16:38      Physical Exam:  Abdomen: soft, non-tender, non-distended, fundus firm  Vaginal: Lochia wnl  Extremities: No erythema/edema    A/P: 32yo PPD#2 s/p . and GHTN    - Patient is stable for discharge and will follow up the PP clinic    Terri Ledezma M.D., M.B.A., M.S.
Associate Chief of L & D (Late entry)     I have met this patient for the first time today.  Vicenta was admitted by Dr garcia for IUGR and fetus with SVT at @* weeks gestation and delivered by Dr Washington    OB Progress Note:  PPD#1    S: 34yo  PPD#1 s/p . Patient feels well. Pain is well controlled. She is tolerating a regular diet and passing flatus. She is voiding spontaneously, and ambulating without difficulty. Denies CP/SOB. Denies lightheadedness/dizziness. Denies N/V.    O:  Vitals:  Vital Signs Last 24 Hrs  T(C): 37 (2023 14:01), Max: 37.4 (2023 19:44)  T(F): 98.6 (2023 14:01), Max: 99.3 (2023 19:44)  HR: 76 (2023 14:01) (60 - 97)  BP: 111/66 (2023 14:01) (110/57 - 149/81)  BP(mean): --  RR: 18 (2023 14:01) (18 - 19)  SpO2: 100% (2023 14:01) (83% - 100%)    Parameters below as of 2023 05:55  Patient On (Oxygen Delivery Method): room air        MEDICATIONS  (STANDING):  acetaminophen     Tablet .. 975 milliGRAM(s) Oral <User Schedule>  diphtheria/tetanus/pertussis (acellular) Vaccine (Adacel) 0.5 milliLiter(s) IntraMuscular once  ibuprofen  Tablet. 600 milliGRAM(s) Oral every 6 hours  ondansetron    Tablet 4 milliGRAM(s) Oral once  prenatal multivitamin 1 Tablet(s) Oral daily  sodium chloride 0.9% lock flush 3 milliLiter(s) IV Push every 8 hours  sodium chloride 0.9%. 1000 milliLiter(s) (125 mL/Hr) IV Continuous <Continuous>      Labs:  Blood type: O Positive  Rubella IgG: RPR: Negative                          11.4<L>   12.49<H> >-----------< 192    (  @ 16:10 )             35.2                        11.1<L>   7.14 >-----------< 204    (  @ 23:55 )             33.1<L>    23 @ 16:38      134<L>  |  101  |  10  ----------------------------<  89  4.3   |  20<L>  |  0.97        Ca    9.0      2023 16:38    TPro  5.6<L>  /  Alb  3.5  /  TBili  0.4  /  DBili  x   /  AST  35<H>  /  ALT  17  /  AlkPhos  165<H>  23 @ 16:38      Physical Exam:  Abdomen: soft, non-tender, non-distended, fundus firm  Vaginal: Lochia wnl  Extremities: No erythema/edema    A/P: 34yo PPD#1 s/p . and GHTN  - Pain well controlled, continue current pain regimen  - Increase ambulation, SCDs when not ambulating  - Continue regular diet  - Monitor BP's closely    Terri Ledezma M.D., M.B.A., M.S.

## 2023-06-22 NOTE — DISCHARGE NOTE OB - CARE PROVIDER_API CALL
Jesas Gonzales  Obstetrics and Gynecology  [address], [address]  [place] [address]  Phone: [phone]  Fax: [phone]  Established Patient  Follow Up Time: 1 month

## 2023-06-22 NOTE — DISCHARGE NOTE OB - CARE PLAN
1 Principal Discharge DX:	 (normal spontaneous vaginal delivery)  Assessment and plan of treatment:	Instructions:  Make your follow-up appointment with your doctor as ordered. No heavy lifting, driving, or strenuous activity for 6 weeks. Nothing per vagina such as tampons, intercourse, douches or tub baths for 6 weeks or until you see your doctor. Call your doctor with any signs and symptoms of infection such as fever, chills, nausea, or vomiting. Call your doctor if you're unable to tolerate food, increase in vaginal bleeding, or have difficulty urinating. Call your doctor if you have pain that is not relieved by your prescription medications. Notify your doctor with any other concerns.  Secondary Diagnosis:	Gestational hypertension  Assessment and plan of treatment:	HTN/PEC: Follow-up with your doctor in 1 week to check your blood pressure.  Prescription handed to you for a blood pressure cuff to monitor your blood pressures at home. Call your doctor if your blood pressure is greater than or equal to 160 systolic (top number) of 110 diastolic (bottom number) or if you experience a headache unrelieved by OTC medications, blurred vision, or difficulty breathing.

## 2023-06-22 NOTE — PROGRESS NOTE ADULT - SUBJECTIVE AND OBJECTIVE BOX
OB PP Progress Note    Patient seen and examined at bedside, no acute overnight events. No acute complaints, pain well controlled. Patient is ambulating, voiding spontaneously, passing gas, and tolerating regular diet. Denies CP, SOB, N/V, HA, blurred vision, epigastric pain.    Vital Signs Last 24 Hours  T(C): 36.6 (06-22-23 @ 05:52), Max: 37 (06-21-23 @ 14:01)  HR: 65 (06-22-23 @ 05:52) (60 - 85)  BP: 110/68 (06-22-23 @ 05:52) (110/68 - 132/84)  RR: 18 (06-22-23 @ 05:52) (16 - 18)  SpO2: 100% (06-22-23 @ 05:52) (100% - 100%)    Physical Exam:  General: NAD  Abdomen: Soft, non-tender, non-distended, fundus firm  Pelvic: Lochia wnl    Labs:    Blood Type: O Positive  Antibody Screen: Positive  RPR: Negative               11.4   12.49 )-----------( 192      ( 06-20 @ 16:10 )             35.2                11.1   7.14  )-----------( 204      ( 06-18 @ 23:55 )             33.1         MEDICATIONS  (STANDING):  acetaminophen     Tablet .. 975 milliGRAM(s) Oral <User Schedule>  diphtheria/tetanus/pertussis (acellular) Vaccine (Adacel) 0.5 milliLiter(s) IntraMuscular once  ibuprofen  Tablet. 600 milliGRAM(s) Oral every 6 hours  ondansetron    Tablet 4 milliGRAM(s) Oral once  prenatal multivitamin 1 Tablet(s) Oral daily  sodium chloride 0.9%. 1000 milliLiter(s) (125 mL/Hr) IV Continuous <Continuous>    MEDICATIONS  (PRN):  benzocaine 20%/menthol 0.5% Spray 1 Spray(s) Topical every 6 hours PRN for Perineal discomfort  dibucaine 1% Ointment 1 Application(s) Topical every 6 hours PRN Perineal discomfort  diphenhydrAMINE 25 milliGRAM(s) Oral every 6 hours PRN Pruritus  hydrocortisone 1% Cream 1 Application(s) Topical every 6 hours PRN Moderate Pain (4-6)  lanolin Ointment 1 Application(s) Topical every 6 hours PRN nipple soreness  magnesium hydroxide Suspension 30 milliLiter(s) Oral two times a day PRN Constipation  oxyCODONE    IR 5 milliGRAM(s) Oral once PRN Moderate to Severe Pain (4-10)  oxyCODONE    IR 5 milliGRAM(s) Oral every 3 hours PRN Moderate to Severe Pain (4-10)  pramoxine 1%/zinc 5% Cream 1 Application(s) Topical every 4 hours PRN Moderate Pain (4-6)  simethicone 80 milliGRAM(s) Chew every 4 hours PRN Gas  witch hazel Pads 1 Application(s) Topical every 4 hours PRN Perineal discomfort

## 2023-06-22 NOTE — DISCHARGE NOTE OB - ADDITIONAL INSTRUCTIONS
Provider Follow up  Please follow up for postpartum appointment in 4-6 weeks with Dr. Gonzales. Please call the office for an appointment.

## 2023-06-23 ENCOUNTER — NON-APPOINTMENT (OUTPATIENT)
Age: 33
End: 2023-06-23

## 2023-06-26 ENCOUNTER — NON-APPOINTMENT (OUTPATIENT)
Age: 33
End: 2023-06-26

## 2023-06-26 DIAGNOSIS — O13.9 GESTATIONAL [PREGNANCY-INDUCED] HYPERTENSION W/OUT SIGNIFICANT PROTEINURIA, UNSPECIFIED TRIMESTER: ICD-10-CM

## 2023-06-26 RX ORDER — FLECAINIDE ACETATE 50 MG/1
50 TABLET ORAL TWICE DAILY
Qty: 180 | Refills: 0 | Status: DISCONTINUED | COMMUNITY
Start: 2023-04-24 | End: 2023-06-26

## 2023-06-26 RX ORDER — PRENATAL VIT NO.126/IRON/FOLIC 28MG-0.8MG
28-0.8 TABLET ORAL DAILY
Refills: 0 | Status: ACTIVE | COMMUNITY
Start: 2023-06-26

## 2023-07-03 ENCOUNTER — NON-APPOINTMENT (OUTPATIENT)
Age: 33
End: 2023-07-03

## 2023-07-10 ENCOUNTER — NON-APPOINTMENT (OUTPATIENT)
Age: 33
End: 2023-07-10

## 2023-07-19 ENCOUNTER — NON-APPOINTMENT (OUTPATIENT)
Age: 33
End: 2023-07-19

## 2023-09-18 LAB — SURGICAL PATHOLOGY STUDY: SIGNIFICANT CHANGE UP

## 2025-06-09 NOTE — PROGRESS NOTE PEDS - SUBJECTIVE AND OBJECTIVE BOX
INTERVAL HISTORY: Started Digoxin yesterday. in NSR from 6p-2am, 215-3am, 615-8am. Now back in SVT since 8am.    BACKGROUND INFORMATION: KATHY WOODS is a 33y old  at 28w1d GA with incidental finding of fetal SVT. Patient sent in from OBGYN office for fetal heart rate measuring 260s in the office. Patient denies all complaints at this time. Patient reports that this has been an uncomplicated pregnancy and denies any past medical /cardiac history. She is not on any medication. No family history of any cardiac disease, sudden cardiac death, arrythmia or need for pacemaker.   Pediatric cardiology consulted due to concerns for fetal SVT.      US done with MFM at bedside  Showed -270bpm of the fetus. No signs of hydrops.    LABORATORY TESTS                          9.2  CBC:   8.12 )-----------( 204   (23 @ 17:45)                          28.6               136   |  105   |  8                  Ca: 8.9    BMP:   ----------------------------< 98     M.70  (23 @ 17:45)             3.5    |  21    | 0.54               Ph: 3.2      LFT:     TPro: 5.7 / Alb: 3.6 / TBili: <0.2 / DBili: x / AST: 21 / ALT: 15 / AlkPhos: 60   (23 @ 17:45)    COAG: PT: x / PTT: 27.9 / INR: x   (23 @ 11:35)     IMAGING STUDIES:  Fetal heart tracing - NSR from 6p-2am, 215-3am, 615-8am. Now back in SVT since 8am.   INTERVAL HISTORY: Started IV Digoxin loading dose yesterday. Brief intermittent periods of sinus rhythm. Now back in SVT since 8am on the monitor. OB resident performed bedside ultrasound and confirmed heart rate to be in 260-270s with 1:1 AV conduction. No pericardial effusion or other evidence of hydrops.     BACKGROUND INFORMATION: KATHY WOODS is a 33y old  at 28w1d GA with incidental finding of fetal SVT. Patient sent in from OBGYN office for fetal heart rate measuring 260s in the office. Patient denies all complaints at this time. Patient reports that this has been an uncomplicated pregnancy and denies any past medical /cardiac history. She is not on any medication. No family history of any cardiac disease, sudden cardiac death, arrythmia or need for pacemaker. Pediatric cardiology consulted due to concerns for fetal SVT.    Examination deferred     LABORATORY TESTS                          9.2  CBC:   8.12 )-----------( 204   (23 @ 17:45)                          28.6               136   |  105   |  8                  Ca: 8.9    BMP:   ----------------------------< 98     M.70  (23 @ 17:45)             3.5    |  21    | 0.54               Ph: 3.2      LFT:     TPro: 5.7 / Alb: 3.6 / TBili: <0.2 / DBili: x / AST: 21 / ALT: 15 / AlkPhos: 60   (23 @ 17:45)    COAG: PT: x / PTT: 27.9 / INR: x   (23 @ 11:35)     IMAGING STUDIES:  Fetal heart tracing - NSR from 6p-2am, 215-3am, 615-8am. Now back in SVT since 8am.   [Dorsal Wrist] : dorsal wrist [Right] : right wrist [No acute displaced fracture or dislocation] : No acute displaced fracture or dislocation